# Patient Record
Sex: MALE | Race: WHITE | Employment: FULL TIME | URBAN - NONMETROPOLITAN AREA
[De-identification: names, ages, dates, MRNs, and addresses within clinical notes are randomized per-mention and may not be internally consistent; named-entity substitution may affect disease eponyms.]

---

## 2020-10-21 ENCOUNTER — APPOINTMENT (OUTPATIENT)
Dept: CT IMAGING | Age: 60
DRG: 287 | End: 2020-10-21
Payer: COMMERCIAL

## 2020-10-21 ENCOUNTER — APPOINTMENT (OUTPATIENT)
Dept: GENERAL RADIOLOGY | Age: 60
DRG: 287 | End: 2020-10-21
Payer: COMMERCIAL

## 2020-10-21 ENCOUNTER — HOSPITAL ENCOUNTER (INPATIENT)
Age: 60
LOS: 2 days | Discharge: HOME | DRG: 287 | End: 2020-10-23
Attending: EMERGENCY MEDICINE | Admitting: INTERNAL MEDICINE
Payer: COMMERCIAL

## 2020-10-21 ENCOUNTER — HOSPITAL ENCOUNTER (OUTPATIENT)
Dept: NON INVASIVE DIAGNOSTICS | Age: 60
Discharge: HOME | DRG: 287 | End: 2020-10-21
Payer: COMMERCIAL

## 2020-10-21 PROBLEM — I50.9 CHF (CONGESTIVE HEART FAILURE) (HCC): Status: ACTIVE | Noted: 2020-10-21

## 2020-10-21 PROBLEM — E03.9 HYPOTHYROID: Status: ACTIVE | Noted: 2020-10-21

## 2020-10-21 PROBLEM — I50.43 ACUTE ON CHRONIC COMBINED SYSTOLIC AND DIASTOLIC CHF, NYHA CLASS 3 (HCC): Status: ACTIVE | Noted: 2020-10-21

## 2020-10-21 LAB
ABSOLUTE EOS #: <0.03 K/UL (ref 0–0.44)
ABSOLUTE IMMATURE GRANULOCYTE: 0.03 K/UL (ref 0–0.3)
ABSOLUTE LYMPH #: 1.51 K/UL (ref 1.1–3.7)
ABSOLUTE MONO #: 0.78 K/UL (ref 0.1–1.2)
ANION GAP SERPL CALCULATED.3IONS-SCNC: 13 MMOL/L (ref 9–17)
BASOPHILS # BLD: 1 % (ref 0–2)
BASOPHILS ABSOLUTE: 0.08 K/UL (ref 0–0.2)
BNP INTERPRETATION: ABNORMAL
BUN BLDV-MCNC: 5 MG/DL (ref 8–23)
BUN/CREAT BLD: 8 (ref 9–20)
CALCIUM SERPL-MCNC: 9.5 MG/DL (ref 8.6–10.4)
CHLORIDE BLD-SCNC: 96 MMOL/L (ref 98–107)
CO2: 25 MMOL/L (ref 20–31)
CREAT SERPL-MCNC: 0.59 MG/DL (ref 0.7–1.2)
D-DIMER QUANTITATIVE: 0.5 MG/L FEU (ref 0–0.59)
DIFFERENTIAL TYPE: ABNORMAL
EKG ATRIAL RATE: 115 BPM
EKG P AXIS: 54 DEGREES
EKG P-R INTERVAL: 150 MS
EKG Q-T INTERVAL: 352 MS
EKG QRS DURATION: 112 MS
EKG QTC CALCULATION (BAZETT): 486 MS
EKG R AXIS: -10 DEGREES
EKG T AXIS: 39 DEGREES
EKG VENTRICULAR RATE: 115 BPM
EOSINOPHILS RELATIVE PERCENT: 0 % (ref 1–4)
GFR AFRICAN AMERICAN: >60 ML/MIN
GFR NON-AFRICAN AMERICAN: >60 ML/MIN
GFR SERPL CREATININE-BSD FRML MDRD: ABNORMAL ML/MIN/{1.73_M2}
GFR SERPL CREATININE-BSD FRML MDRD: ABNORMAL ML/MIN/{1.73_M2}
GLUCOSE BLD-MCNC: 145 MG/DL (ref 70–99)
HCT VFR BLD CALC: 44.5 % (ref 40.7–50.3)
HEMOGLOBIN: 15.1 G/DL (ref 13–17)
IMMATURE GRANULOCYTES: 0 %
LV EF: 30 %
LVEF MODALITY: NORMAL
LYMPHOCYTES # BLD: 20 % (ref 24–43)
MAGNESIUM: 1.9 MG/DL (ref 1.6–2.6)
MCH RBC QN AUTO: 35.4 PG (ref 25.2–33.5)
MCHC RBC AUTO-ENTMCNC: 33.9 G/DL (ref 28.4–34.8)
MCV RBC AUTO: 104.2 FL (ref 82.6–102.9)
MONOCYTES # BLD: 10 % (ref 3–12)
NRBC AUTOMATED: 0 PER 100 WBC
PDW BLD-RTO: 12.6 % (ref 11.8–14.4)
PLATELET # BLD: 142 K/UL (ref 138–453)
PLATELET ESTIMATE: ABNORMAL
PMV BLD AUTO: 11.3 FL (ref 8.1–13.5)
POTASSIUM SERPL-SCNC: 3.6 MMOL/L (ref 3.7–5.3)
PRO-BNP: 817 PG/ML
RBC # BLD: 4.27 M/UL (ref 4.21–5.77)
RBC # BLD: ABNORMAL 10*6/UL
SEG NEUTROPHILS: 69 % (ref 36–65)
SEGMENTED NEUTROPHILS ABSOLUTE COUNT: 5.14 K/UL (ref 1.5–8.1)
SODIUM BLD-SCNC: 134 MMOL/L (ref 135–144)
THYROXINE, FREE: 0.36 NG/DL (ref 0.93–1.7)
TROPONIN INTERP: NORMAL
TROPONIN INTERP: NORMAL
TROPONIN T: NORMAL NG/ML
TROPONIN T: NORMAL NG/ML
TROPONIN, HIGH SENSITIVITY: 17 NG/L (ref 0–22)
TROPONIN, HIGH SENSITIVITY: 18 NG/L (ref 0–22)
TSH SERPL DL<=0.05 MIU/L-ACNC: 49.88 MIU/L (ref 0.3–5)
WBC # BLD: 7.5 K/UL (ref 3.5–11.3)
WBC # BLD: ABNORMAL 10*3/UL

## 2020-10-21 PROCEDURE — 1200000000 HC SEMI PRIVATE

## 2020-10-21 PROCEDURE — 6360000002 HC RX W HCPCS: Performed by: NURSE PRACTITIONER

## 2020-10-21 PROCEDURE — 83036 HEMOGLOBIN GLYCOSYLATED A1C: CPT

## 2020-10-21 PROCEDURE — 93005 ELECTROCARDIOGRAM TRACING: CPT | Performed by: EMERGENCY MEDICINE

## 2020-10-21 PROCEDURE — 84443 ASSAY THYROID STIM HORMONE: CPT

## 2020-10-21 PROCEDURE — 84484 ASSAY OF TROPONIN QUANT: CPT

## 2020-10-21 PROCEDURE — 93010 ELECTROCARDIOGRAM REPORT: CPT | Performed by: INTERNAL MEDICINE

## 2020-10-21 PROCEDURE — 84439 ASSAY OF FREE THYROXINE: CPT

## 2020-10-21 PROCEDURE — 83880 ASSAY OF NATRIURETIC PEPTIDE: CPT

## 2020-10-21 PROCEDURE — 99284 EMERGENCY DEPT VISIT MOD MDM: CPT

## 2020-10-21 PROCEDURE — 6360000002 HC RX W HCPCS

## 2020-10-21 PROCEDURE — 36415 COLL VENOUS BLD VENIPUNCTURE: CPT

## 2020-10-21 PROCEDURE — 93306 TTE W/DOPPLER COMPLETE: CPT

## 2020-10-21 PROCEDURE — 80048 BASIC METABOLIC PNL TOTAL CA: CPT

## 2020-10-21 PROCEDURE — 71045 X-RAY EXAM CHEST 1 VIEW: CPT

## 2020-10-21 PROCEDURE — 6360000004 HC RX CONTRAST MEDICATION: Performed by: EMERGENCY MEDICINE

## 2020-10-21 PROCEDURE — 2580000003 HC RX 258: Performed by: NURSE PRACTITIONER

## 2020-10-21 PROCEDURE — 83735 ASSAY OF MAGNESIUM: CPT

## 2020-10-21 PROCEDURE — 6370000000 HC RX 637 (ALT 250 FOR IP): Performed by: NURSE PRACTITIONER

## 2020-10-21 PROCEDURE — 99255 IP/OBS CONSLTJ NEW/EST HI 80: CPT | Performed by: INTERNAL MEDICINE

## 2020-10-21 PROCEDURE — 85379 FIBRIN DEGRADATION QUANT: CPT

## 2020-10-21 PROCEDURE — 71260 CT THORAX DX C+: CPT

## 2020-10-21 PROCEDURE — 2500000003 HC RX 250 WO HCPCS

## 2020-10-21 PROCEDURE — 85025 COMPLETE CBC W/AUTO DIFF WBC: CPT

## 2020-10-21 RX ORDER — METOPROLOL SUCCINATE 25 MG/1
25 TABLET, EXTENDED RELEASE ORAL DAILY
Status: DISCONTINUED | OUTPATIENT
Start: 2020-10-21 | End: 2020-10-22

## 2020-10-21 RX ORDER — FUROSEMIDE 10 MG/ML
40 INJECTION INTRAMUSCULAR; INTRAVENOUS DAILY
Status: DISCONTINUED | OUTPATIENT
Start: 2020-10-21 | End: 2020-10-22

## 2020-10-21 RX ORDER — ACETAMINOPHEN 325 MG/1
650 TABLET ORAL EVERY 6 HOURS PRN
Status: DISCONTINUED | OUTPATIENT
Start: 2020-10-21 | End: 2020-10-23 | Stop reason: HOSPADM

## 2020-10-21 RX ORDER — FAMOTIDINE 20 MG/1
20 TABLET, FILM COATED ORAL 2 TIMES DAILY
Status: DISCONTINUED | OUTPATIENT
Start: 2020-10-21 | End: 2020-10-23 | Stop reason: HOSPADM

## 2020-10-21 RX ORDER — ONDANSETRON 2 MG/ML
4 INJECTION INTRAMUSCULAR; INTRAVENOUS EVERY 6 HOURS PRN
Status: DISCONTINUED | OUTPATIENT
Start: 2020-10-21 | End: 2020-10-23 | Stop reason: HOSPADM

## 2020-10-21 RX ORDER — LEVOTHYROXINE SODIUM 0.15 MG/1
150 TABLET ORAL DAILY
Status: DISCONTINUED | OUTPATIENT
Start: 2020-10-22 | End: 2020-10-23 | Stop reason: HOSPADM

## 2020-10-21 RX ORDER — POLYETHYLENE GLYCOL 3350 17 G/17G
17 POWDER, FOR SOLUTION ORAL DAILY PRN
Status: DISCONTINUED | OUTPATIENT
Start: 2020-10-21 | End: 2020-10-23 | Stop reason: HOSPADM

## 2020-10-21 RX ORDER — SODIUM CHLORIDE 0.9 % (FLUSH) 0.9 %
10 SYRINGE (ML) INJECTION PRN
Status: DISCONTINUED | OUTPATIENT
Start: 2020-10-21 | End: 2020-10-23 | Stop reason: HOSPADM

## 2020-10-21 RX ORDER — SODIUM CHLORIDE 0.9 % (FLUSH) 0.9 %
10 SYRINGE (ML) INJECTION EVERY 12 HOURS SCHEDULED
Status: DISCONTINUED | OUTPATIENT
Start: 2020-10-21 | End: 2020-10-23 | Stop reason: HOSPADM

## 2020-10-21 RX ORDER — ACETAMINOPHEN 650 MG/1
650 SUPPOSITORY RECTAL EVERY 6 HOURS PRN
Status: DISCONTINUED | OUTPATIENT
Start: 2020-10-21 | End: 2020-10-23 | Stop reason: HOSPADM

## 2020-10-21 RX ORDER — LOSARTAN POTASSIUM 25 MG/1
25 TABLET ORAL DAILY
Status: DISCONTINUED | OUTPATIENT
Start: 2020-10-21 | End: 2020-10-23 | Stop reason: HOSPADM

## 2020-10-21 RX ORDER — PROMETHAZINE HYDROCHLORIDE 25 MG/1
12.5 TABLET ORAL EVERY 6 HOURS PRN
Status: DISCONTINUED | OUTPATIENT
Start: 2020-10-21 | End: 2020-10-23 | Stop reason: HOSPADM

## 2020-10-21 RX ORDER — POTASSIUM CHLORIDE 20 MEQ/1
40 TABLET, EXTENDED RELEASE ORAL ONCE
Status: COMPLETED | OUTPATIENT
Start: 2020-10-21 | End: 2020-10-21

## 2020-10-21 RX ADMIN — METOPROLOL SUCCINATE 25 MG: 25 TABLET, EXTENDED RELEASE ORAL at 12:40

## 2020-10-21 RX ADMIN — LOSARTAN POTASSIUM 25 MG: 25 TABLET ORAL at 12:40

## 2020-10-21 RX ADMIN — FAMOTIDINE 20 MG: 20 TABLET, FILM COATED ORAL at 21:33

## 2020-10-21 RX ADMIN — SODIUM CHLORIDE, PRESERVATIVE FREE 10 ML: 5 INJECTION INTRAVENOUS at 21:50

## 2020-10-21 RX ADMIN — POTASSIUM CHLORIDE 40 MEQ: 1500 TABLET, EXTENDED RELEASE ORAL at 16:08

## 2020-10-21 RX ADMIN — IOPAMIDOL 75 ML: 755 INJECTION, SOLUTION INTRAVENOUS at 08:41

## 2020-10-21 RX ADMIN — FUROSEMIDE 40 MG: 10 INJECTION, SOLUTION INTRAMUSCULAR; INTRAVENOUS at 12:40

## 2020-10-21 RX ADMIN — ENOXAPARIN SODIUM 40 MG: 40 INJECTION, SOLUTION INTRAVENOUS; SUBCUTANEOUS at 12:40

## 2020-10-21 ASSESSMENT — PAIN SCALES - GENERAL
PAINLEVEL_OUTOF10: 0

## 2020-10-21 NOTE — ED PROVIDER NOTES
[facility] COMPLAINT   Chief Complaint   Patient presents with    Tachycardia     Pt states \"I woke up several times with my heart racing during the night\"    Shortness of Breath     Progressive over 1-2months with exertion      HPI   Live Courtney is a 56 y.o. male who presents with shortness of breath. The onset was months but it is worse lately and associated with tachycardia and waking up in the night short of breath. He notes that he also has new onset lower leg swelling. He also has no chest pain but shortness of breath on exertion. He has not been to his regular doctor in a long time      REVIEW OF SYSTEMS   Cardiac: +palpitations, Denies syncope  Respiratory: +SOB as above  Neurologic: Denies syncope or LOC  GI: Denies Vomiting, Denies Diarrhea  General: Denies measured Fever  All other review of systems otherwise negative. PAST MEDICAL & SURGICAL HISTORY   Past Medical History:   Diagnosis Date    Hypertension      History reviewed. No pertinent surgical history.    CURRENT MEDICATIONS      ALLERGIES   No Known Allergies   SOCIAL & FAMILY HISTORY   Social History     Socioeconomic History    Marital status:      Spouse name: None    Number of children: None    Years of education: None    Highest education level: None   Occupational History    None   Social Needs    Financial resource strain: None    Food insecurity     Worry: None     Inability: None    Transportation needs     Medical: None     Non-medical: None   Tobacco Use    Smoking status: Former Smoker     Last attempt to quit: 2011     Years since quittin.8    Smokeless tobacco: Never Used   Substance and Sexual Activity    Alcohol use: None    Drug use: None    Sexual activity: None   Lifestyle    Physical activity     Days per week: None     Minutes per session: None    Stress: None   Relationships    Social connections     Talks on phone: None     Gets together: None     Attends Worship service: None     Active member of club or organization: None     Attends meetings of clubs or organizations: None     Relationship status: None    Intimate partner violence     Fear of current or ex partner: None     Emotionally abused: None     Physically abused: None     Forced sexual activity: None   Other Topics Concern    None   Social History Narrative    None     History reviewed. No pertinent family history. PHYSICAL EXAM   VITAL SIGNS: Pulse 107   Temp 98.1 °F (36.7 °C)   Resp 13   Ht 5' 11\" (1.803 m)   Wt 260 lb (117.9 kg)   SpO2 95%   BMI 36.26 kg/m²    Constitutional: Well developed, well nourished, no acute distress   HENT: Atraumatic, wet mucus membranes  Neck: supple, no JVD   Respiratory: Lungs reveal some mild tachypnea   Cardiovascular: regular rate, no murmurs  GI: Soft, nontender, normal bowel sounds  Musculoskeletal: mild-mod bilateral LE edema, no acute deformities  Integument: Skin is warm and dry, no petechiae   Neurologic: Alert & oriented, normal speech  Psych: Pleasant affect, the patient does not appear anxious   EKG (Interpreted by me)  Sinus tach rhythm at 115 BPM, no stemi      RADIOLOGY/PROCEDURES   CT CHEST PULMONARY EMBOLISM W CONTRAST   Final Result   No evidence of pulmonary embolism or acute pulmonary abnormality. Trace pericardial effusion. Hepatic steatosis. XR CHEST PORTABLE   Final Result   No acute findings. ED COURSE & MEDICAL DECISION MAKING   Pertinent Labs & Imaging studies reviewed and interpreted. (See chart for details)     Vitals:    10/21/20 0700   Pulse: 107   Resp: 13   Temp:    SpO2: 95%     Consultation: Dr. Clau Frederick was consulted.  He had pt undergo ultrasound and dr Lela Moody was paged for for admission (paged at 10:30 AM)  Differential Diagnosis: COPD exacerbation, foreign body aspiration, Congestive Heart Failure, Myocardial Infarction, Pulmonary Embolus, Pneumonia, Pneumothorax, other     MDM:

## 2020-10-21 NOTE — PLAN OF CARE
Nutrition Problem #1: Altered nutrition-related lab values  Intervention: Food and/or Nutrient Delivery: Continue Current Diet  Nutritional Goals: >75% of meals    Electronically signed by Severo GONZALEZ RDN, OMAR on 10/21/2020 at 2:52 PM    Contact Number:  [phone]

## 2020-10-21 NOTE — PROGRESS NOTES
Discussed discharge plans with the patient. Patient is a 56year old male here with CHF. He is alert and oriented. Patient is  and lives at home with his wife. He uses no medical equipment. The patient does the cooking. His wife and him share in the cleaning. Patient is independent with his ADL's. He manages his own medication and drives. His PCP is Dr. Makenna Jones MD. He has medical insurance that helps with medication costs. The discharge plan is home. The DX of CHF is new for the patient. Talked about the importance of a low sodium diet and weighing himself daily. Spoke with his nurse and she will be getting him information on his CHF DX. He does not have advance directives and is not interested at this time. JANEEW to monitor and assist with any needs or concerns as they arise.     DAVIS Cole

## 2020-10-21 NOTE — CONSULTS
Jovan Dean am scribing for and in the presence of Clement Byrnes MD, F.A.C.C..    Patient: Mary Hedrick  : 1960  Date of Admission: 10/21/2020  Primary Care Physician: Oumou Beebe  Today's Date: 10/21/2020    REASON FOR CONSULTATION: Tachycardia (Pt states \"I woke up several times with my heart racing during the night\") and Shortness of Breath (Progressive over 1-2months with exertion)      HPI: Mr. Nydia Mcdowell is a 56 y.o. male with no prior cardiac history who was admitted to the hospital because of worsening shortness of breath, currently treated for acute on chronic combined CHF, NYHA class III. Patient has history of hypertension and dyslipidemia. He did not see any doctor for the past 9 years. Not on any medications at home. History of smoking. He smoked 2 packs a day and quit in . Denies any history of diabetes. He reported no significant family history of premature CAD. He woke up this morning short of breath and was heart racing. He is complaining of progressive shortness of breath on exertion since the summer. He has bilateral lower extremity swelling that is getting worse over the past couple weeks. He is short of breath on mild exertion and this is the first time he is short of breath at rest.  Work-up in the emergency room showed elevated BNP, evidence of pulmonary vascular congestion and sinus tachycardia and ECG with nonspecific ST segment changes. I reviewed his echo on 10/21/2020, ejection fraction 35%. Severe global hypokinesis with no segmental abnormalities. Moderate diastolic dysfunction. No significant valvular abnormalities. He denies any chest pain or pressure. He complains of chest tightness which he attributes it to his breathing problem rather than an actual chest pain. No significant radiation. No sweating or diaphoresis. His wife thinks that he has sleep apnea syndrome because of loud snoring, waking up choking and daytime fatigue.     Past Medical History:   Diagnosis Date    Hypertension        CURRENT ALLERGIES: Patient has no known allergies. REVIEW OF SYSTEMS: 14 systems were reviewed. Pertinent positives and negatives as above, all else negative. History reviewed. No pertinent surgical history. Social History:  Social History     Tobacco Use    Smoking status: Former Smoker     Last attempt to quit: 2011     Years since quittin.8    Smokeless tobacco: Never Used   Substance Use Topics    Alcohol use: Not on file    Drug use: Not on file        CURRENT MEDICATIONS:  No outpatient medications have been marked as taking for the 10/21/20 encounter [facility] Encounter). sodium chloride flush 0.9 % injection 10 mL, 2 times per day  sodium chloride flush 0.9 % injection 10 mL, PRN  acetaminophen (TYLENOL) tablet 650 mg, Q6H PRN    Or  acetaminophen (TYLENOL) suppository 650 mg, Q6H PRN  polyethylene glycol (GLYCOLAX) packet 17 g, Daily PRN  promethazine (PHENERGAN) tablet 12.5 mg, Q6H PRN    Or  ondansetron (ZOFRAN) injection 4 mg, Q6H PRN  famotidine (PEPCID) tablet 20 mg, BID  enoxaparin (LOVENOX) injection 40 mg, Daily  losartan (COZAAR) tablet 25 mg, Daily  metoprolol succinate (TOPROL XL) extended release tablet 25 mg, Daily  furosemide (LASIX) injection 40 mg, Daily         FAMILY HISTORY: Reviewed, no significant family history of premature CAD. PHYSICAL EXAM:   Pulse 107   Temp 98.1 °F (36.7 °C)   Resp 13   Ht 5' 11\" (1.803 m)   Wt 260 lb (117.9 kg)   SpO2 95%   BMI 36.26 kg/m²  Body mass index is 36.26 kg/m². Constitutional: He is oriented to person, place, and time. He appears well-developed and well-nourished. In no acute distress. HEENT: Normocephalic and atraumatic. No JVD present. Carotid bruit is not present. No mass and no thyromegaly present. No lymphadenopathy present. Cardiovascular: Tachycardic rate, regular rhythm, normal heart sounds. Exam reveals no gallop and no friction rubs.  No heart murmur heard.   Pulmonary/Chest: Effort normal and breath sounds normal. No respiratory distress. He has no wheezes, rhonchi or rales. Abdominal: Soft, non-tender. Bowel sounds and aorta are normal. He exhibits no organomegaly, mass or bruit. Extremities: 1+ lower extremity pitting pedal edema. 1/2 way up to the knees bilaterally No cyanosis and no clubbing. Pulses are 2+ radial and carotid pulses. 2+ dorsalis pedis and posterior tibial pulses bilaterally. Neurological: He is alert and oriented to person, place, and time. No evidence of gross cranial nerve deficit. Coordination appeared normal.   Skin: Skin is warm and dry. There is no rash or diaphoresis. Psychiatric: He has a normal mood and affect. His speech is normal and behavior is normal.      MOST RECENT LABS ON RECORD:   Lab Results   Component Value Date    WBC 7.5 10/21/2020    HGB 15.1 10/21/2020    HCT 44.5 10/21/2020     10/21/2020     (L) 10/21/2020    K 3.6 (L) 10/21/2020    CL 96 (L) 10/21/2020    CREATININE 0.59 (L) 10/21/2020    BUN 5 (L) 10/21/2020    CO2 25 10/21/2020        ASSESSMENT:  Patient Active Problem List    Diagnosis Date Noted    CHF (congestive heart failure) ([facility] [address] 10/21/2020     Priority: Low    Acute on chronic combined systolic and diastolic CHF, NYHA class 3 ([address] 10/21/2020     Priority: Low       PLAN:    Patient presented to the hospital with worsening shortness of breath, found to have acute on chronic combined CHF with ejection fraction of 35%. Suspect hypertensive heart failure although his blood pressure is very well controlled today probably because of low ejection fraction. Patient has history of hypertension, not taking any medicine for the past 9 years. History of dyslipidemia. He also has a long history of smoking. Complaining of chest tightness along with sinus tachycardia nonspecific ST segment abnormalities on the EKG. I had a very long discussion with the patient and his wife.   I told him he must stay in the hospital for the management of his CHF. Also because two thirds of the cases with low ejection fraction along with his history of chest tightness and multiple risk factors for CAD can be caused by coronary artery disease, I think it is a must to get a cardiac catheterization tomorrow morning to differentiate between ischemic and nonischemic cardiomyopathy. We will start him on Toprol-XL 25 mg daily. We will start him on low-dose losartan for now. Start Lasix 40 mg daily. Daily weight, low-salt diet and strict input/output. Closely monitor kidney function and electrolytes. Heart catheterization with coronary angiography:  I discussed the risks, benefits, and alternatives to the procedure including the risk of bleeding, contrast induced allergy and/or kidney damage, arrythmia, stroke, heart attack, death, or the need for further procedures. I also discussed the fact that although treatment with simple medical management is a potential treatment option in place of cardiac catheterization, I expressed my opinion that cardiac catheterization in order to define his coronary anatomy and rule out severe 3 vessel or left main coronary artery disease would significant help guide the most appropriate treatment strategy ranging from no treatment to medications, to stents, to even bypass surgery. Mr. Yinka Birch verbalized understanding of the risks benefits and alternatives and stated that he would like to proceed with heart catheterization. I also discussed the advantages and disadvantages of having his procedure performed here at [facility] vs. a larger hospital such as [facility]. Beyond the obvious advantage of convenience, I also explained potential disadvantages including the inability to have immediate cardiac stenting performed or the presence of on site CT surgical backup.  However, because of the lack of significant high risk feature on his stress test, I did tell him I thought that in his particular case, it would likely be safe to perform the procedure here. Therefore, after considering the options, Mr. Deidre Little said he would prefer to have the procedure performed here at [facility] and so I scheduled the coronary angiography for tomorrow morning. .    I told his wife that her concern regarding him having obstructive sleep apnea syndrome is reasonable and we will test him for sleep apnea as an outpatient after improvement of his volume status. Morbid obesity: Body mass index is 36.61 kg/m². I also briefly discussed both diet and exercise strategies for him to continue to loses weight and he was very receptive to this. Once again, thank you for allowing me to participate in this patients care. Please do not hesitate to contact me if I could be of any further assistance. Sincerely,  Cory King MD, MS, F.A.C.C. [facility]) [facility], [address] [place] [address], [address]  Phone: [phone], Fax: [phone]      I believe that the risk of significant morbidity and mortality related to the patient's current medical conditions are: intermediate-high. The documentation recorded by the scribe, accurately and completely reflects the services I personally performed and the decisions made by me. Cory King MD, F.A.C.C.  October 21, 2020

## 2020-10-21 NOTE — PROGRESS NOTES
Nutrition Assessment     Type and Reason for Visit: Initial(Nutrition screen)    Nutrition Recommendations/Plan:   1. Consider protein with each meal   2. Increase fruit and vegetable intake. 3. Reviewed low sodium diet, and may also benefit from Heart Healthy diet plan. Nutrition Assessment:  Altered nutrition-related lab values related to cardiac dysfunction, endocrine dysfuntion as evidenced by (Na: 134, K: 3.6, BUN: 5, Creatinine: 0.59, Glucose: 145, and lower intakes for one day. Pt reported not feeling well for three days, but the last day was very rough. Pt reports feeling very tired, and not able to sleep at night. He typically drinks 4 shots of whiskey to help him sleep daily. Pt reported not so good sleep and drinking coffee to boost the energy. Pt reports also typically not eating breakfast in the morning, but he eats lunch and dinner. No nausea per pt, however he felt queasy and felt like able to vomit, but did not. Pt reports steady weight for the last two years~ 260lbs. Note some edema in lower extremities, +2 pitting. Labs reviewed, consistent with a poor appetite for a day, BUN and creatinine are low. Potassium low secondary to loose stool. Pt meets moderate nutrition risk and does not meet the criteria for malnutrition. Malnutrition Assessment:  Malnutrition Status: At risk for malnutrition (Comment)       Recent Labs     10/21/20  0650   *   K 3.6*   CL 96*   CO2 25   BUN 5*   CREATININE 0.59*   GLUCOSE 145*   GFR               No results found for: LABALBU     Nutrition Related Findings: edema: RLE: +2 pitting, LLE: +2 pitting      Current Nutrition Therapies:    DIET LOW SODIUM 2 GM;     Anthropometric Measures:  · Height: 5' 11\" (180.3 cm)  · Current Body Wt: 262 lb (118.8 kg)   · BMI: 36.6    Nutrition Diagnosis:   · Altered nutrition-related lab values related to cardiac dysfunction, endocrine dysfuntion as evidenced by (Na: 134, K: 3.6, BUN: 5, Creatinine: 0.59, Glucose: 145)      Nutrition Interventions:   Food and/or Nutrient Delivery:  Continue Current Diet  Nutrition Education/Counseling:  Education initiated   Coordination of Nutrition Care:  Continued Inpatient Monitoring    Goals:  >75% of meals       Nutrition Monitoring and Evaluation:   Behavioral-Environmental Outcomes:  Beliefs and Attitutes   Food/Nutrient Intake Outcomes:  Food and Nutrient Intake  Physical Signs/Symptoms Outcomes:  Biochemical Data, Fluid Status or Edema, Weight     Discharge Planning:    Continue current diet     Electronically signed by Tahir Munson RD, LD on 10/21/20 at 2:28 PM EDT    Contact: [phone]

## 2020-10-21 NOTE — PLAN OF CARE
Problem: Pain:  Goal: Pain level will decrease  Description: Pain level will decrease  Outcome: Ongoing  Note: Pain assessed every 4 hours. Patient is able to communicate with staff the need for pain interventions. Patient currently not complaining of any pain. Pain medications available as needed. Will continue to monitor. Problem: Pain:  Goal: Control of acute pain  Description: Control of acute pain  Outcome: Ongoing     Problem: Pain:  Goal: Control of chronic pain  Description: Control of chronic pain  Outcome: Ongoing     Problem: Falls - Risk of:  Goal: Will remain free from falls  Description: Will remain free from falls  Outcome: Ongoing  Note: Fall assessment completed daily. Bed in lowest position. Call light within reach. Side rails up 2/4. Patient ambulates independently in the room. Will continue to monitor. Problem: Falls - Risk of:  Goal: Absence of physical injury  Description: Absence of physical injury  Outcome: Ongoing  Note: Patient is free from physical injury. Problem: OXYGENATION/RESPIRATORY FUNCTION  Goal: Patient will maintain patent airway  Outcome: Ongoing  Note: Patient is awake and alert, able to cough and clear secretions if present. Will continue to monitor. Problem: OXYGENATION/RESPIRATORY FUNCTION  Goal: Patient will achieve/maintain normal respiratory rate/effort  Description: Respiratory rate and effort will be within normal limits for the patient  Outcome: Ongoing  Note: See respiratory assessment. Problem: HEMODYNAMIC STATUS  Goal: Patient has stable vital signs and fluid balance  Outcome: Ongoing  Note: Will monitor vital signs and intake and output. Problem: FLUID AND ELECTROLYTE IMBALANCE  Goal: Fluid and electrolyte balance are achieved/maintained  Outcome: Ongoing  Note: Will monitor intake and output. Will monitor labs.       Problem: ACTIVITY INTOLERANCE/IMPAIRED MOBILITY  Goal: Mobility/activity is maintained at optimum level for patient  Outcome: Ongoing  Note: Patient ambulates independently in the room and tolerates this activity well.

## 2020-10-21 NOTE — H&P
History and Physical    Patient:  Mary Hedrick  MRN: [id number]    Chief Complaint:  Heart racing    History Obtained From:  patient, electronic medical record    PCP: Oumou Beebe MD    History of Present Illness: The patient is a 56 y.o. male who presented to the ER with complaints of his heart racing when he woke up. He complained of progressing SOB over the last 1-2 months. He explained that he is waking up at night often and is SOB. He does admit to leg edema. He denied CP but does admit to some chest pressure. He has not been to a PCP in some time. Upon his evaluation he was found to have Acute on Chronic CHF and was admitted for diuresis. His Echocardiogram show EF of 30% and will be planned for a Cardiac Cath in the am.     Past Medical History:        Diagnosis Date    Hypertension        Past Surgical History:    History reviewed. No pertinent surgical history. Family History:   History reviewed. No pertinent family history. Social History:   TOBACCO:   reports that he quit smoking about 9 years ago. He has never used smokeless tobacco.  ETOH:   reports current alcohol use. ELICIT DRUG USE:    Social History     Substance and Sexual Activity   Drug Use Never     OCCUPATION: Retired      Allergies:  Patient has no known allergies. Medications Prior to Admission:    Prior to Admission medications    Not on File       Review of Systems:  Constitutional:negative  for fevers, and negative for chills.   Eyes: negative for visual disturbance   ENT: negative for sore throat, negative nasal congestion, and negative for earache  Respiratory: positive for shortness of breath, negative for cough, and negative for wheezing  Cardiovascular: negative for chest pain, negative for palpitations, and negative for syncope, positive chest pressure  Gastrointestinal: negative for abdominal pain, negative for nausea,negative for vomiting, negative for diarrhea, negative for constipation, and negative for hematochezia or melena  Genitourinary: negative for dysuria, negative for urinary urgency, negative for urinary frequency, and negative for hematuria  Skin: negative for skin rash, and negative for skin lesions  Neurological: negative for unilateral weakness, numbness or tingling. Physical Exam:    Vitals:   Temp: 97.7 °F (36.5 °C)  BP: (!) 152/98  Resp: 18  Pulse: 100  SpO2: 98 %  24HR INTAKE/OUTPUT:      Intake/Output Summary (Last 24 hours) at 10/21/2020 1558  Last data filed at 10/21/2020 1339  Gross per 24 hour   Intake --   Output 900 ml   Net -900 ml       Exam:  GEN:  alert and oriented to person, place and time, well-developed and well-nourished, in no acute distress  EYES: No gross abnormalities. , PERRL and EOMI  NECK: normal, supple, no lymphadenopathy,  no carotid bruits  PULM: clear to auscultation bilaterally- no wheezes, rales or rhonchi, normal air movement, no respiratory distress  COR: regular rate & rhythm, no gallops, S1 normal and S2 normal  ABD:  soft, non-tender, non-distended, normal bowel sounds, no masses or organomegaly  EXT:   edema: 1+ affecting bilateral foot, bilateral ankle, bilateral calf  NEURO: follows commands, LOMBARDI, no deficits  SKIN:  no rashes or significant lesions  -----------------------------------------------------------------  Diagnostic Data:   Lab Results   Component Value Date    WBC 7.5 10/21/2020    HGB 15.1 10/21/2020     10/21/2020       Lab Results   Component Value Date    BUN 5 (L) 10/21/2020    CREATININE 0.59 (L) 10/21/2020     (L) 10/21/2020    K 3.6 (L) 10/21/2020    CALCIUM 9.5 10/21/2020    CL 96 (L) 10/21/2020    CO2 25 10/21/2020    LABGLOM >60 10/21/2020       No results found for: Reggie Antu, EPITHUA, LEUKOCYTESUR, SPECGRAV, GLUCOSEU, KETUA, PROTEINU, HGBUR, CASTUA, CRYSTUA, BACTERIA, YEAST    Lab Results   Component Value Date    TROPONINT NOT REPORTED 10/21/2020    PROBNP 817 (H) 10/21/2020       Xr Chest Portable    Result Date: 10/21/2020  EXAMINATION: ONE XRAY VIEW OF THE CHEST 10/21/2020 7:27 am COMPARISON: None. HISTORY: ORDERING SYSTEM PROVIDED HISTORY: dyspnea TECHNOLOGIST PROVIDED HISTORY: dyspnea FINDINGS: Lungs are clear. Cardiomegaly. No pulmonary edema. No acute findings. Ct Chest Pulmonary Embolism W Contrast    Result Date: 10/21/2020  EXAMINATION: CTA OF THE CHEST 10/21/2020 8:40 am TECHNIQUE: CTA of the chest was performed after the administration of intravenous contrast.  Multiplanar reformatted images are provided for review. MIP images are provided for review. Dose modulation, iterative reconstruction, and/or weight based adjustment of the mA/kV was utilized to reduce the radiation dose to as low as reasonably achievable. COMPARISON: None. HISTORY: ORDERING SYSTEM PROVIDED HISTORY: tachy, sob, weak + d-dimer TECHNOLOGIST PROVIDED HISTORY: tachy, sob, weak + d-dimer FINDINGS: Pulmonary Arteries: Pulmonary arteries are adequately opacified for evaluation. No evidence of intraluminal filling defect to suggest pulmonary embolism. Main pulmonary artery is normal in caliber. Mediastinum: No evidence of mediastinal lymphadenopathy. There is a trace pericardial effusion. There is no acute abnormality of the thoracic aorta. Lungs/pleura: The lungs are without acute process. No focal consolidation or pulmonary edema. No evidence of pleural effusion or pneumothorax. Upper Abdomen: There is hepatic steatosis. Soft Tissues/Bones: No acute bone or soft tissue abnormality. No evidence of pulmonary embolism or acute pulmonary abnormality. Trace pericardial effusion. Hepatic steatosis. Assessment:    Active Problems:    CHF (congestive heart failure) (HCC)    Acute on chronic combined systolic and diastolic CHF, NYHA class 3 (HCC)  Resolved Problems:    * No resolved hospital problems.  *      Patient Active Problem List    Diagnosis Date Noted    CHF (congestive heart failure) ([address] 10/21/2020    Acute on chronic combined systolic and diastolic CHF, NYHA class 3 ([facility] [address] 10/21/2020       Plan:     · This patient requires inpatient admission because of Acute on Chronic Combined CHF  · Factors affecting the medical complexity of this patient include HTN, Hypothyroidism  · Estimated length of stay is 3 days  · Acute on Chronic Combined CHF  · Appreciate Cardiology  · Plan Cath in AM  · Telemetry  · Lasix 40 mg IV daily  · I/O  · ECHO-30% EF  · Hypothyroidism  · TSH--49.88 -- Start Synthroid 150 mcg daily  · HTN  · Cozaar 25 mg daily  · High risk medication monitoring: None    CORE MEASURES  DVT prophylaxis: Lovenox  Decubitus ulcer present on admission: No  CODE STATUS: FULL CODE  Nutrition Status: good   Physical therapy: NA   Old Charts reviewed: Yes  EKG Reviewed:  Yes  Advance Directive Addressed: Yes    CORAL Ross, NP-C  10/21/2020, 3:58 PM

## 2020-10-21 NOTE — ED NOTES
Contacted Dr. Farhad Clinton per Dr. Allie Villarreal request.     Christi Ibarra [facility]  10/21/20 1707

## 2020-10-21 NOTE — PROGRESS NOTES
Admission vitals and assessment completed at this time. Blood pressure slightly high and patient is tachycardic, vitals otherwise WNL. Patient denies pain. Patient is alert and oriented x4, patient denies numbness or tingling. Patient has impaired vision, wears glasses. Respirations even and unlabored at rest, slightly labored with exertion. Lung sounds clear-diminished throughout. Heart sounds strong, regular, tachycardic. Abdomen soft and rounded, non-tender, with active bowel sounds throughout. Patient has +2 pitting edema to BLE. Capillary refill less than 3 seconds, pulses palpable, skin warm to BLE. Scattered abrasions and bruising present. Patient denies feeling weak, troubles urinating, or any other issues at this time. Patient sitting quietly in chair with call light in reach, denies needs. Will continue to monitor.

## 2020-10-21 NOTE — PROGRESS NOTES
Patient arrived on floor at this time via wheelchair, transferred independently from wheelchair to chair in room. Will complete admission assessment, vitals, and navigator shortly.

## 2020-10-22 ENCOUNTER — APPOINTMENT (OUTPATIENT)
Dept: CARDIAC CATH/INVASIVE PROCEDURES | Age: 60
DRG: 287 | End: 2020-10-22
Payer: COMMERCIAL

## 2020-10-22 ENCOUNTER — APPOINTMENT (OUTPATIENT)
Dept: NON INVASIVE DIAGNOSTICS | Age: 60
DRG: 287 | End: 2020-10-22
Payer: COMMERCIAL

## 2020-10-22 PROBLEM — I42.9 IDIOPATHIC CARDIOMYOPATHY (HCC): Status: ACTIVE | Noted: 2020-10-22

## 2020-10-22 LAB
ABSOLUTE EOS #: <0.03 K/UL (ref 0–0.44)
ABSOLUTE IMMATURE GRANULOCYTE: <0.03 K/UL (ref 0–0.3)
ABSOLUTE LYMPH #: 1.77 K/UL (ref 1.1–3.7)
ABSOLUTE MONO #: 0.81 K/UL (ref 0.1–1.2)
ANION GAP SERPL CALCULATED.3IONS-SCNC: 14 MMOL/L (ref 9–17)
BASOPHILS # BLD: 1 % (ref 0–2)
BASOPHILS ABSOLUTE: 0.1 K/UL (ref 0–0.2)
BUN BLDV-MCNC: 10 MG/DL (ref 8–23)
BUN/CREAT BLD: 16 (ref 9–20)
CALCIUM SERPL-MCNC: 8.8 MG/DL (ref 8.6–10.4)
CHLORIDE BLD-SCNC: 93 MMOL/L (ref 98–107)
CHOLESTEROL/HDL RATIO: 3.5
CHOLESTEROL: 188 MG/DL
CO2: 26 MMOL/L (ref 20–31)
CREAT SERPL-MCNC: 0.62 MG/DL (ref 0.7–1.2)
DIFFERENTIAL TYPE: ABNORMAL
EOSINOPHILS RELATIVE PERCENT: 0 % (ref 1–4)
ESTIMATED AVERAGE GLUCOSE: 111 MG/DL
ESTIMATED AVERAGE GLUCOSE: 114 MG/DL
GFR AFRICAN AMERICAN: >60 ML/MIN
GFR NON-AFRICAN AMERICAN: >60 ML/MIN
GFR SERPL CREATININE-BSD FRML MDRD: ABNORMAL ML/MIN/{1.73_M2}
GFR SERPL CREATININE-BSD FRML MDRD: ABNORMAL ML/MIN/{1.73_M2}
GLUCOSE BLD-MCNC: 118 MG/DL (ref 70–99)
HBA1C MFR BLD: 5.5 % (ref 4–6)
HBA1C MFR BLD: 5.6 % (ref 4–6)
HCT VFR BLD CALC: 43.6 % (ref 40.7–50.3)
HDLC SERPL-MCNC: 54 MG/DL
HEMOGLOBIN: 14.6 G/DL (ref 13–17)
IMMATURE GRANULOCYTES: 0 %
LDL CHOLESTEROL: 109 MG/DL (ref 0–130)
LYMPHOCYTES # BLD: 22 % (ref 24–43)
MAGNESIUM: 1.9 MG/DL (ref 1.6–2.6)
MCH RBC QN AUTO: 35.5 PG (ref 25.2–33.5)
MCHC RBC AUTO-ENTMCNC: 33.5 G/DL (ref 28.4–34.8)
MCV RBC AUTO: 106.1 FL (ref 82.6–102.9)
MONOCYTES # BLD: 10 % (ref 3–12)
NRBC AUTOMATED: 0 PER 100 WBC
PDW BLD-RTO: 12.6 % (ref 11.8–14.4)
PLATELET # BLD: 111 K/UL (ref 138–453)
PLATELET ESTIMATE: ABNORMAL
PMV BLD AUTO: 12.6 FL (ref 8.1–13.5)
POTASSIUM SERPL-SCNC: 4 MMOL/L (ref 3.7–5.3)
RBC # BLD: 4.11 M/UL (ref 4.21–5.77)
RBC # BLD: ABNORMAL 10*6/UL
SEG NEUTROPHILS: 67 % (ref 36–65)
SEGMENTED NEUTROPHILS ABSOLUTE COUNT: 5.47 K/UL (ref 1.5–8.1)
SODIUM BLD-SCNC: 133 MMOL/L (ref 135–144)
TRIGL SERPL-MCNC: 123 MG/DL
VLDLC SERPL CALC-MCNC: NORMAL MG/DL (ref 1–30)
WBC # BLD: 8.2 K/UL (ref 3.5–11.3)
WBC # BLD: ABNORMAL 10*3/UL

## 2020-10-22 PROCEDURE — 99024 POSTOP FOLLOW-UP VISIT: CPT | Performed by: INTERNAL MEDICINE

## 2020-10-22 PROCEDURE — C1894 INTRO/SHEATH, NON-LASER: HCPCS

## 2020-10-22 PROCEDURE — C1769 GUIDE WIRE: HCPCS

## 2020-10-22 PROCEDURE — 93458 L HRT ARTERY/VENTRICLE ANGIO: CPT | Performed by: FAMILY MEDICINE

## 2020-10-22 PROCEDURE — 36415 COLL VENOUS BLD VENIPUNCTURE: CPT

## 2020-10-22 PROCEDURE — 2709999900 HC NON-CHARGEABLE SUPPLY

## 2020-10-22 PROCEDURE — 1200000000 HC SEMI PRIVATE

## 2020-10-22 PROCEDURE — C1887 CATHETER, GUIDING: HCPCS

## 2020-10-22 PROCEDURE — 6370000000 HC RX 637 (ALT 250 FOR IP): Performed by: FAMILY MEDICINE

## 2020-10-22 PROCEDURE — 2580000003 HC RX 258: Performed by: FAMILY MEDICINE

## 2020-10-22 PROCEDURE — 83735 ASSAY OF MAGNESIUM: CPT

## 2020-10-22 PROCEDURE — B2111ZZ FLUOROSCOPY OF MULTIPLE CORONARY ARTERIES USING LOW OSMOLAR CONTRAST: ICD-10-PCS | Performed by: FAMILY MEDICINE

## 2020-10-22 PROCEDURE — 83036 HEMOGLOBIN GLYCOSYLATED A1C: CPT

## 2020-10-22 PROCEDURE — 4A023N7 MEASUREMENT OF CARDIAC SAMPLING AND PRESSURE, LEFT HEART, PERCUTANEOUS APPROACH: ICD-10-PCS | Performed by: FAMILY MEDICINE

## 2020-10-22 PROCEDURE — 80061 LIPID PANEL: CPT

## 2020-10-22 PROCEDURE — 6360000002 HC RX W HCPCS: Performed by: FAMILY MEDICINE

## 2020-10-22 PROCEDURE — B2151ZZ FLUOROSCOPY OF LEFT HEART USING LOW OSMOLAR CONTRAST: ICD-10-PCS | Performed by: FAMILY MEDICINE

## 2020-10-22 PROCEDURE — 80048 BASIC METABOLIC PNL TOTAL CA: CPT

## 2020-10-22 PROCEDURE — 6360000004 HC RX CONTRAST MEDICATION: Performed by: INTERNAL MEDICINE

## 2020-10-22 PROCEDURE — 85025 COMPLETE CBC W/AUTO DIFF WBC: CPT

## 2020-10-22 RX ORDER — ACETAMINOPHEN 325 MG/1
650 TABLET ORAL EVERY 4 HOURS PRN
Status: DISCONTINUED | OUTPATIENT
Start: 2020-10-22 | End: 2020-10-23 | Stop reason: HOSPADM

## 2020-10-22 RX ORDER — SODIUM CHLORIDE 9 MG/ML
INJECTION, SOLUTION INTRAVENOUS CONTINUOUS
Status: DISCONTINUED | OUTPATIENT
Start: 2020-10-22 | End: 2020-10-23 | Stop reason: HOSPADM

## 2020-10-22 RX ORDER — SODIUM CHLORIDE 0.9 % (FLUSH) 0.9 %
10 SYRINGE (ML) INJECTION EVERY 12 HOURS SCHEDULED
Status: DISCONTINUED | OUTPATIENT
Start: 2020-10-22 | End: 2020-10-23 | Stop reason: HOSPADM

## 2020-10-22 RX ORDER — NITROGLYCERIN 0.4 MG/1
0.4 TABLET SUBLINGUAL EVERY 5 MIN PRN
Status: DISCONTINUED | OUTPATIENT
Start: 2020-10-22 | End: 2020-10-23 | Stop reason: HOSPADM

## 2020-10-22 RX ORDER — FUROSEMIDE 10 MG/ML
40 INJECTION INTRAMUSCULAR; INTRAVENOUS 2 TIMES DAILY
Status: DISCONTINUED | OUTPATIENT
Start: 2020-10-22 | End: 2020-10-23 | Stop reason: HOSPADM

## 2020-10-22 RX ORDER — CARVEDILOL 12.5 MG/1
12.5 TABLET ORAL 2 TIMES DAILY WITH MEALS
Status: DISCONTINUED | OUTPATIENT
Start: 2020-10-22 | End: 2020-10-23 | Stop reason: HOSPADM

## 2020-10-22 RX ORDER — SODIUM CHLORIDE 0.9 % (FLUSH) 0.9 %
10 SYRINGE (ML) INJECTION PRN
Status: DISCONTINUED | OUTPATIENT
Start: 2020-10-22 | End: 2020-10-23 | Stop reason: HOSPADM

## 2020-10-22 RX ORDER — DIPHENHYDRAMINE HCL 25 MG
50 CAPSULE ORAL ONCE
Status: DISCONTINUED | OUTPATIENT
Start: 2020-10-22 | End: 2020-10-23 | Stop reason: HOSPADM

## 2020-10-22 RX ADMIN — ENOXAPARIN SODIUM 40 MG: 40 INJECTION, SOLUTION INTRAVENOUS; SUBCUTANEOUS at 12:46

## 2020-10-22 RX ADMIN — IOPAMIDOL 80 ML: 755 INJECTION, SOLUTION INTRAVENOUS at 10:38

## 2020-10-22 RX ADMIN — FAMOTIDINE 20 MG: 20 TABLET, FILM COATED ORAL at 12:45

## 2020-10-22 RX ADMIN — FUROSEMIDE 40 MG: 10 INJECTION, SOLUTION INTRAMUSCULAR; INTRAVENOUS at 17:05

## 2020-10-22 RX ADMIN — FAMOTIDINE 20 MG: 20 TABLET, FILM COATED ORAL at 20:31

## 2020-10-22 RX ADMIN — LOSARTAN POTASSIUM 25 MG: 25 TABLET ORAL at 12:45

## 2020-10-22 RX ADMIN — Medication 10 ML: at 20:31

## 2020-10-22 RX ADMIN — SODIUM CHLORIDE, PRESERVATIVE FREE 10 ML: 5 INJECTION INTRAVENOUS at 12:56

## 2020-10-22 RX ADMIN — SODIUM CHLORIDE: 9 INJECTION, SOLUTION INTRAVENOUS at 09:37

## 2020-10-22 RX ADMIN — CARVEDILOL 12.5 MG: 12.5 TABLET, FILM COATED ORAL at 17:05

## 2020-10-22 RX ADMIN — LEVOTHYROXINE SODIUM 150 MCG: 150 TABLET ORAL at 12:45

## 2020-10-22 ASSESSMENT — PAIN SCALES - GENERAL
PAINLEVEL_OUTOF10: 0

## 2020-10-22 NOTE — PROGRESS NOTES
Baldo Daughters am scribing for and in the presence of Xiao Troncoso MD, F.A.C.C..    Patient: Rigoberto Dick  : 1960  Date of Admission: 10/21/2020  Primary Care Physician: Zina Gallo  Today's Date: 10/22/2020    REASON FOR CONSULTATION: Tachycardia (Pt states \"I woke up several times with my heart racing during the night\") and Shortness of Breath (Progressive over 1-2months with exertion)      HPI: Mr. Ming Delatorre is a 56 y.o. male with no prior cardiac history who was admitted to the hospital because of worsening shortness of breath, currently treated for acute on chronic combined CHF, NYHA class III. Patient has history of hypertension and dyslipidemia. He did not see any doctor for the past 9 years. Not on any medications at home. History of smoking. He smoked 2 packs a day and quit in . Denies any history of diabetes. He reported no significant family history of premature CAD. He woke up this morning short of breath and was heart racing. He is complaining of progressive shortness of breath on exertion since the summer. He has bilateral lower extremity swelling that is getting worse over the past couple weeks. He is short of breath on mild exertion and this is the first time he is short of breath at rest.  Work-up in the emergency room showed elevated BNP, evidence of pulmonary vascular congestion and sinus tachycardia and ECG with nonspecific ST segment changes. I reviewed his echo on 10/21/2020, ejection fraction 35%. Severe global hypokinesis with no segmental abnormalities. Moderate diastolic dysfunction. No significant valvular abnormalities. He is feeling a little better this morning. Still cannot sleep well at night. Has negative fluid balance of 1800. No chest pain, pressure or tightness. Found to have hypothyroidism started on Synthroid therapy. Going for cardiac catheterization today.     Past Medical History:   Diagnosis Date    Chronic combined systolic and diastolic CHF (congestive heart failure) [address] [address]     History of echocardiogram 10/21/2020    EF 30%    Hypertension     Hypothyroidism        CURRENT ALLERGIES: Patient has no known allergies. REVIEW OF SYSTEMS: 14 systems were reviewed. Pertinent positives and negatives as above, all else negative. History reviewed. No pertinent surgical history. Social History:  Social History     Tobacco Use    Smoking status: Former Smoker     Last attempt to quit: 2011     Years since quittin.8    Smokeless tobacco: Never Used   Substance Use Topics    Alcohol use: Yes     Comment: 4 shots of whiskey daily    Drug use: Never        CURRENT MEDICATIONS:  No outpatient medications have been marked as taking for the 10/21/20 encounter [facility] Encounter). sodium chloride flush 0.9 % injection 10 mL, 2 times per day  sodium chloride flush 0.9 % injection 10 mL, PRN  acetaminophen (TYLENOL) tablet 650 mg, Q6H PRN    Or  acetaminophen (TYLENOL) suppository 650 mg, Q6H PRN  polyethylene glycol (GLYCOLAX) packet 17 g, Daily PRN  promethazine (PHENERGAN) tablet 12.5 mg, Q6H PRN    Or  ondansetron (ZOFRAN) injection 4 mg, Q6H PRN  famotidine (PEPCID) tablet 20 mg, BID  enoxaparin (LOVENOX) injection 40 mg, Daily  losartan (COZAAR) tablet 25 mg, Daily  metoprolol succinate (TOPROL XL) extended release tablet 25 mg, Daily  furosemide (LASIX) injection 40 mg, Daily  levothyroxine (SYNTHROID) tablet 150 mcg, Daily         FAMILY HISTORY: Reviewed, no significant family history of premature CAD. PHYSICAL EXAM:   BP (!) 140/92   Pulse 91   Temp 97.1 °F (36.2 °C) (Temporal)   Resp 16   Ht 5' 11\" (1.803 m)   Wt 261 lb (118.4 kg)   SpO2 97%   BMI 36.40 kg/m²  Body mass index is 36.4 kg/m². Constitutional: He is oriented to person, place, and time. He appears well-developed and well-nourished. In no acute distress. HEENT: Normocephalic and atraumatic. No JVD present. Carotid bruit is not present.  No mass and no thyromegaly present. No lymphadenopathy present. Cardiovascular: Tachycardic rate, regular rhythm, normal heart sounds. Exam reveals no gallop and no friction rubs. No heart murmur heard. Pulmonary/Chest: Effort normal and breath sounds normal. No respiratory distress. He has no wheezes, rhonchi or rales. Abdominal: Soft, non-tender. Bowel sounds and aorta are normal. He exhibits no organomegaly, mass or bruit. Extremities: 1+ lower extremity pitting pedal edema. 1/2 way up to the knees bilaterally No cyanosis and no clubbing. Pulses are 2+ radial and carotid pulses. 2+ dorsalis pedis and posterior tibial pulses bilaterally. Neurological: He is alert and oriented to person, place, and time. No evidence of gross cranial nerve deficit. Coordination appeared normal.   Skin: Skin is warm and dry. There is no rash or diaphoresis. Psychiatric: He has a normal mood and affect. His speech is normal and behavior is normal.      MOST RECENT LABS ON RECORD:   Lab Results   Component Value Date    WBC 8.2 10/22/2020    HGB 14.6 10/22/2020    HCT 43.6 10/22/2020     (L) 10/22/2020     (L) 10/22/2020    K 4.0 10/22/2020    CL 93 (L) 10/22/2020    CREATININE 0.62 (L) 10/22/2020    BUN 10 10/22/2020    CO2 26 10/22/2020    TSH 49.88 (H) 10/21/2020        ASSESSMENT:  Patient Active Problem List    Diagnosis Date Noted    CHF (congestive heart failure) ([address] 10/21/2020     Priority: Low    Acute on chronic combined systolic and diastolic CHF, NYHA class 3 ([address] 10/21/2020     Priority: Low    Hypothyroid 10/21/2020     Priority: Low       PLAN:    Patient presented to the hospital with worsening shortness of breath, found to have acute on chronic combined CHF with ejection fraction of 35%. Suspect hypertensive heart failure. Patient has history of hypertension, not taking any medicine for the past 9 years. History of dyslipidemia. He also has a long history of smoking.   Complaining of chest tightness along with sinus tachycardia nonspecific ST segment abnormalities on the EKG. Because two thirds of the cases with low ejection fraction along with his history of chest tightness and multiple risk factors for CAD can be caused by coronary artery disease, I think it is a must to get a cardiac catheterization to differentiate between ischemic and nonischemic cardiomyopathy. Change Toprol-XL to carvedilol 12.5 mg daily for better control of blood pressure. Continue losartan. Increase Lasix to 40 mg twice daily  Daily weight, low-salt diet and strict input/output. Closely monitor kidney function and electrolytes. Heart catheterization with coronary angiography:  I discussed the risks, benefits, and alternatives to the procedure including the risk of bleeding, contrast induced allergy and/or kidney damage, arrythmia, stroke, heart attack, death, or the need for further procedures. I also discussed the fact that although treatment with simple medical management is a potential treatment option in place of cardiac catheterization, I expressed my opinion that cardiac catheterization in order to define his coronary anatomy and rule out severe 3 vessel or left main coronary artery disease would significant help guide the most appropriate treatment strategy ranging from no treatment to medications, to stents, to even bypass surgery. Mr. Ryan Rowalnd verbalized understanding of the risks benefits and alternatives and stated that he would like to proceed with heart catheterization. I also discussed the advantages and disadvantages of having his procedure performed here at [facility] vs. a larger hospital such as [facility]. Beyond the obvious advantage of convenience, I also explained potential disadvantages including the inability to have immediate cardiac stenting performed or the presence of on site CT surgical backup.  However, because of the lack of significant high risk feature on his stress test, I did tell him I thought that in his particular case, it would likely be safe to perform the procedure here. Therefore, after considering the options, Mr. Dwaine Pennington said he would prefer to have the procedure performed here at [facility] and so I scheduled the coronary angiography for tomorrow morning. .    I told his wife that her concern regarding him having obstructive sleep apnea syndrome is reasonable and we will test him for sleep apnea as an outpatient after improvement of his volume status. Morbid obesity: Body mass index is 36.4 kg/m². I also briefly discussed both diet and exercise strategies for him to continue to loses weight and he was very receptive to this. Once again, thank you for allowing me to participate in this patients care. Please do not hesitate to contact me if I could be of any further assistance. Sincerely,  Kandice Coronado MD, MS, F.A.C.C. [facility], [address], [facility], [place] [address], [address] [facility]  Phone: [phone], Fax: [phone]      I believe that the risk of significant morbidity and mortality related to the patient's current medical conditions are: intermediate-high. The documentation recorded by the scribe, accurately and completely reflects the services I personally performed and the decisions made by me. Kandice Coronado MD, F.A.C.C.  October 22, 2020

## 2020-10-22 NOTE — PLAN OF CARE
Problem: Pain:  Goal: Pain level will decrease  Description: Pain level will decrease  10/22/2020 1120 by Evette Michaels RN  Outcome: Ongoing  Note: Pain assessed every 4 hours. Patient is able to communicate with staff the need for pain interventions. Patient currently not complaining of any pain. Pain medications available if needed. Will continue to monitor. Problem: Pain:  Goal: Control of acute pain  Description: Control of acute pain  10/22/2020 1120 by Evette Michaels RN  Outcome: Ongoing     Problem: Pain:  Goal: Control of chronic pain  Description: Control of chronic pain  10/22/2020 1120 by Evette Michaels RN  Outcome: Ongoing     Problem: Falls - Risk of:  Goal: Will remain free from falls  Description: Will remain free from falls  10/22/2020 1120 by Evette Michaels RN  Outcome: Ongoing  Note: Fall assessment completed daily. Bed in lowest position. Call light within reach. Falling star posted to outside of door. Fall risk sticker in place on ID band. Side rails up 2/4. Patient ambulates independently in the room. Will continue to monitor. Problem: Falls - Risk of:  Goal: Absence of physical injury  Description: Absence of physical injury  10/22/2020 1120 by Evette Michaels RN  Outcome: Ongoing  Note: Patient is free from physical injury. Problem: OXYGENATION/RESPIRATORY FUNCTION  Goal: Patient will maintain patent airway  10/22/2020 1120 by Evette Michaels RN  Outcome: Ongoing  Note: Patient is awake and alert, able to cough and clear secretions if needed. Will continue to monitor. Problem: OXYGENATION/RESPIRATORY FUNCTION  Goal: Patient will achieve/maintain normal respiratory rate/effort  Description: Respiratory rate and effort will be within normal limits for the patient  10/22/2020 1120 by Evette Michaels RN  Outcome: Ongoing  Note: See respiratory assessment.       Problem: HEMODYNAMIC STATUS  Goal: Patient has stable vital signs and fluid balance  10/22/2020 1120 by Evette Michaels RN  Outcome: Ongoing  Note: Will monitor vital signs and intake and output. Problem: FLUID AND ELECTROLYTE IMBALANCE  Goal: Fluid and electrolyte balance are achieved/maintained  10/22/2020 1120 by Lin Mendes RN  Outcome: Ongoing  Note: Will monitor intake and output. Will monitor labs. Problem: ACTIVITY INTOLERANCE/IMPAIRED MOBILITY  Goal: Mobility/activity is maintained at optimum level for patient  10/22/2020 1120 by Lin Mendes RN  Outcome: Ongoing  Note: Patient ambulates independently in the room.       Problem: Nutrition  Goal: Optimal nutrition therapy  Description: Nutrition Problem #1: Altered nutrition-related lab values  Intervention: Food and/or Nutrient Delivery: Continue Current Diet  Nutritional Goals: >75% of meals      10/22/2020 1120 by Lin Mednes RN  Outcome: Ongoing

## 2020-10-22 NOTE — PROGRESS NOTES
Patient returned back from procedure. Patient denies any pain at this time. Right wrist has elastoplast x3 layers and gauze in place for pressure dressing, no s/s of bleeding noted.  Will continue to monitor

## 2020-10-22 NOTE — PROGRESS NOTES
Second layer of pressure dressing to right wrist removed at this time. No bleeding or swelling noted. Will removed third layer shortly.

## 2020-10-22 NOTE — PROGRESS NOTES
Patient is visibly tense and apprehensive about getting the heart cath and when he will get to go home. Writer offered him written education and encouraged questions be asked about the procedure. Writer also encouraged patient to verbalize his feelings and concerns. Patient refused the education pack but did ask for a short explanation of what the procedure would be this morning. Writer provided verbal education and offered the written information again but patient refused.

## 2020-10-22 NOTE — PROGRESS NOTES
Delvin Leslie M.D. Internal Medicine Progress Note 10/22/20    SUBJECTIVE:    Patient seen for f/u of Acute on chronic combined systolic and diastolic CHF, NYHA class 3 ([address] He denies chest pain today. Did not sleep well and is anxious about his heart cath. Denies SOB at rest.      ROS:   Constitutional: negative  for fevers, and negative for chills. Respiratory: negative for shortness of breath, negative for cough, and negative for wheezing  Cardiovascular: negative for chest pain, and negative for palpitations  Gastrointestinal: negative for abdominal pain, negative for nausea,negative for vomiting, negative for diarrhea, and negative for constipation     All other systems were reviewed with the patient and are negative unless otherwise stated in HPI    OBJECTIVE:  Vitals:   Temp: 97.1 °F (36.2 °C)  BP: (!) 140/92  Resp: 16  Pulse: 91  SpO2: 97 %    24HR INTAKE/OUTPUT:      Intake/Output Summary (Last 24 hours) at 10/22/2020 0756  Last data filed at 10/22/2020 0705  Gross per 24 hour   Intake --   Output 1850 ml   Net -1850 ml     -----------------------------------------------------------------  Exam:  GEN:    Awake, alert and oriented x3. EYES:  EOMI, pupils equal   NECK: Supple. No lymphadenopathy. No carotid bruit  CVS:    regular rate and rhythm, systolic murmur  PULM:  CTA, no wheezes, rales or rhonchi, no acute respiratory distress  ABD:    Bowels sounds normal.  Abdomen is soft. No distention. no tenderness to palpation. EXT:   1+ edema bilaterally . No calf tenderness. NEURO: Moves all extremities. Motor and sensory are grossly intact  SKIN:  No rashes.   No skin lesions.    -----------------------------------------------------------------  Diagnostic Data:    · All available data reviewed  Lab Results   Component Value Date    WBC 8.2 10/22/2020    HGB 14.6 10/22/2020    .1 (H) 10/22/2020     (L) 10/22/2020      Lab Results   Component Value Date    GLUCOSE 118 (H) 10/22/2020    BUN 10 10/22/2020    CREATININE 0.62 (L) 10/22/2020     (L) 10/22/2020    K 4.0 10/22/2020    CALCIUM 8.8 10/22/2020    CL 93 (L) 10/22/2020    CO2 26 10/22/2020       PROBLEM LIST:  Principal Problem:    Acute on chronic combined systolic and diastolic CHF, NYHA class 3 ([facility])  Active Problems:    CHF (congestive heart failure) ([facility])    Hypothyroid  Resolved Problems:    * No resolved hospital problems. *      ASSESSMENT / PLAN:  · Acute on chronic combined systolic and diastolic CHF, NYHA class 3 ([facility] [address]  ? Appreciate Cardiology consult  ? Diuresis  ? Plan for cath this AM  · Hypertension  ? Continue Losartan   · Hypothyroidism with elevated TSH  ?  Synthroid initiated  · DVT prophylaxis: Lovenox   · High risk medications: none   · Disposition:  Discharge plan is home    Leana Mcardle , M.D.  10/22/2020  7:56 AM

## 2020-10-22 NOTE — PROGRESS NOTES
Patient arrived back on floor at this time from cath lab. Vitals obtained. Patient sitting on edge of bed eating lunch. Will monitor patient.

## 2020-10-22 NOTE — PROGRESS NOTES
First layer of pressure dressing to right wrist removed at this time. No bleeding or swelling noted to the area. Will remove second layer in 15--20 minutes.

## 2020-10-22 NOTE — PROGRESS NOTES
by: Arleth Tidwell on 10/22/2020 1:26 PM      Electronically signed by:  Guille Zimmerman MD 10/22/2020 3:55 PM

## 2020-10-22 NOTE — PLAN OF CARE
Problem: Pain:  Goal: Pain level will decrease  Description: Pain level will decrease  Outcome: Ongoing  Goal: Control of acute pain  Description: Control of acute pain  Outcome: Ongoing  Goal: Control of chronic pain  Description: Control of chronic pain  Outcome: Ongoing     Problem: Falls - Risk of:  Goal: Will remain free from falls  Description: Will remain free from falls  Outcome: Ongoing  Goal: Absence of physical injury  Description: Absence of physical injury  Outcome: Ongoing     Problem: OXYGENATION/RESPIRATORY FUNCTION  Goal: Patient will maintain patent airway  Outcome: Ongoing  Goal: Patient will achieve/maintain normal respiratory rate/effort  Description: Respiratory rate and effort will be within normal limits for the patient  Outcome: Ongoing     Problem: HEMODYNAMIC STATUS  Goal: Patient has stable vital signs and fluid balance  Outcome: Ongoing     Problem: FLUID AND ELECTROLYTE IMBALANCE  Goal: Fluid and electrolyte balance are achieved/maintained  Outcome: Ongoing     Problem: ACTIVITY INTOLERANCE/IMPAIRED MOBILITY  Goal: Mobility/activity is maintained at optimum level for patient  Outcome: Ongoing     Problem: Nutrition  Goal: Optimal nutrition therapy  Description: Nutrition Problem #1: Altered nutrition-related lab values  Intervention: Food and/or Nutrient Delivery: Continue Current Diet  Nutritional Goals: >75% of meals      Outcome: Ongoing

## 2020-10-22 NOTE — PROGRESS NOTES
Third layer of pressure dressing removed at this time. No bleeding or swelling noted, site clean, dry and intact. Pulse palpable, capillary refill less than 3 seconds. Patient reminded to not put any weight on his right wrist and to only use it for basic ADL's. Patient verbalizes understanding. Will continue to monitor.

## 2020-10-22 NOTE — PROGRESS NOTES
Pressure dressing to the right wrist loosened at this time, no s/s of bleeding noted, will continue to monitor

## 2020-10-23 VITALS
OXYGEN SATURATION: 97 % | BODY MASS INDEX: 36.82 KG/M2 | HEART RATE: 80 BPM | DIASTOLIC BLOOD PRESSURE: 85 MMHG | HEIGHT: 71 IN | RESPIRATION RATE: 16 BRPM | WEIGHT: 263 LBS | TEMPERATURE: 97.5 F | SYSTOLIC BLOOD PRESSURE: 138 MMHG

## 2020-10-23 LAB
ANION GAP SERPL CALCULATED.3IONS-SCNC: 16 MMOL/L (ref 9–17)
BUN BLDV-MCNC: 12 MG/DL (ref 8–23)
BUN/CREAT BLD: 20 (ref 9–20)
CALCIUM SERPL-MCNC: 8.3 MG/DL (ref 8.6–10.4)
CHLORIDE BLD-SCNC: 95 MMOL/L (ref 98–107)
CO2: 22 MMOL/L (ref 20–31)
CREAT SERPL-MCNC: 0.6 MG/DL (ref 0.7–1.2)
GFR AFRICAN AMERICAN: >60 ML/MIN
GFR NON-AFRICAN AMERICAN: >60 ML/MIN
GFR SERPL CREATININE-BSD FRML MDRD: ABNORMAL ML/MIN/{1.73_M2}
GFR SERPL CREATININE-BSD FRML MDRD: ABNORMAL ML/MIN/{1.73_M2}
GLUCOSE BLD-MCNC: 103 MG/DL (ref 70–99)
MAGNESIUM: 1.9 MG/DL (ref 1.6–2.6)
POTASSIUM SERPL-SCNC: 3.6 MMOL/L (ref 3.7–5.3)
SODIUM BLD-SCNC: 133 MMOL/L (ref 135–144)

## 2020-10-23 PROCEDURE — 99233 SBSQ HOSP IP/OBS HIGH 50: CPT | Performed by: INTERNAL MEDICINE

## 2020-10-23 PROCEDURE — 6360000002 HC RX W HCPCS: Performed by: FAMILY MEDICINE

## 2020-10-23 PROCEDURE — 6370000000 HC RX 637 (ALT 250 FOR IP): Performed by: FAMILY MEDICINE

## 2020-10-23 PROCEDURE — 36415 COLL VENOUS BLD VENIPUNCTURE: CPT

## 2020-10-23 PROCEDURE — 2580000003 HC RX 258: Performed by: FAMILY MEDICINE

## 2020-10-23 PROCEDURE — 80048 BASIC METABOLIC PNL TOTAL CA: CPT

## 2020-10-23 PROCEDURE — 83735 ASSAY OF MAGNESIUM: CPT

## 2020-10-23 RX ORDER — LOSARTAN POTASSIUM 25 MG/1
25 TABLET ORAL DAILY
Qty: 30 TABLET | Refills: 3 | Status: SHIPPED | OUTPATIENT
Start: 2020-10-24 | End: 2020-11-04 | Stop reason: ALTCHOICE

## 2020-10-23 RX ORDER — POTASSIUM CHLORIDE 20 MEQ/1
20 TABLET, EXTENDED RELEASE ORAL DAILY
Qty: 30 TABLET | Refills: 5 | Status: SHIPPED | OUTPATIENT
Start: 2020-10-23 | End: 2022-06-13 | Stop reason: SDUPTHER

## 2020-10-23 RX ORDER — FUROSEMIDE 40 MG/1
40 TABLET ORAL DAILY
Qty: 30 TABLET | Refills: 3 | Status: SHIPPED | OUTPATIENT
Start: 2020-10-23 | End: 2021-02-26 | Stop reason: SDUPTHER

## 2020-10-23 RX ORDER — LEVOTHYROXINE SODIUM 0.15 MG/1
150 TABLET ORAL DAILY
Qty: 30 TABLET | Refills: 3 | Status: SHIPPED | OUTPATIENT
Start: 2020-10-24 | End: 2021-02-26 | Stop reason: SDUPTHER

## 2020-10-23 RX ORDER — CARVEDILOL 12.5 MG/1
12.5 TABLET ORAL 2 TIMES DAILY WITH MEALS
Qty: 60 TABLET | Refills: 3 | Status: SHIPPED | OUTPATIENT
Start: 2020-10-23 | End: 2021-02-26 | Stop reason: SDUPTHER

## 2020-10-23 RX ADMIN — ENOXAPARIN SODIUM 40 MG: 40 INJECTION, SOLUTION INTRAVENOUS; SUBCUTANEOUS at 08:41

## 2020-10-23 RX ADMIN — CARVEDILOL 12.5 MG: 12.5 TABLET, FILM COATED ORAL at 07:53

## 2020-10-23 RX ADMIN — FAMOTIDINE 20 MG: 20 TABLET, FILM COATED ORAL at 08:41

## 2020-10-23 RX ADMIN — LOSARTAN POTASSIUM 25 MG: 25 TABLET ORAL at 08:41

## 2020-10-23 RX ADMIN — LEVOTHYROXINE SODIUM 150 MCG: 150 TABLET ORAL at 07:53

## 2020-10-23 RX ADMIN — FUROSEMIDE 40 MG: 10 INJECTION, SOLUTION INTRAMUSCULAR; INTRAVENOUS at 08:41

## 2020-10-23 RX ADMIN — Medication 10 ML: at 08:41

## 2020-10-23 ASSESSMENT — PAIN SCALES - GENERAL: PAINLEVEL_OUTOF10: 0

## 2020-10-23 NOTE — PROGRESS NOTES
Please make sure to call in for transition of care. I ordered sleep study to be done as an outpatient. He has to get blood work prior to his next visit.   Thank you

## 2020-10-23 NOTE — DISCHARGE SUMMARY
None. HISTORY: ORDERING SYSTEM PROVIDED HISTORY: tachy, sob, weak + d-dimer TECHNOLOGIST PROVIDED HISTORY: tachy, sob, weak + d-dimer FINDINGS: Pulmonary Arteries: Pulmonary arteries are adequately opacified for evaluation. No evidence of intraluminal filling defect to suggest pulmonary embolism. Main pulmonary artery is normal in caliber. Mediastinum: No evidence of mediastinal lymphadenopathy. There is a trace pericardial effusion. There is no acute abnormality of the thoracic aorta. Lungs/pleura: The lungs are without acute process. No focal consolidation or pulmonary edema. No evidence of pleural effusion or pneumothorax. Upper Abdomen: There is hepatic steatosis. Soft Tissues/Bones: No acute bone or soft tissue abnormality. No evidence of pulmonary embolism or acute pulmonary abnormality. Trace pericardial effusion. Hepatic steatosis. Assessment and Plan:  Patient Active Problem List    Diagnosis Date Noted    Idiopathic cardiomyopathy ([facility] 10/22/2020     Priority: High    CHF (congestive heart failure) ([facility] [facility] 10/21/2020    Acute on chronic combined systolic and diastolic CHF, NYHA class 3 ([facility] [facility] 10/21/2020    Hypothyroid 10/21/2020        Discharge Medications:       Dhiraj Leon   Louisa Medication Instructions [id number]    Printed on:10/23/20 0980   Medication Information                      carvedilol (COREG) 12.5 MG tablet  Take 1 tablet by mouth 2 times daily (with meals)             furosemide (LASIX) 40 MG tablet  Take 1 tablet by mouth daily             levothyroxine (SYNTHROID) 150 MCG tablet  Take 1 tablet by mouth Daily             losartan (COZAAR) 25 MG tablet  Take 1 tablet by mouth daily             potassium chloride (KLOR-CON M) 20 MEQ extended release tablet  Take 1 tablet by mouth daily                 Patient Instructions:    Activity: activity as tolerated  Diet: cardiac diet  Wound Care: none needed  Other: None     Disposition:   Discharge to Home    Follow up:  Patient will be followed by Hector Leos MD in 1-2 weeks    CORE MEASURES on Discharge (if applicable)  ACE/ARB in CHF: Yes  Statin in MI: NA  ASA in MI: NA  Statin in CVA: NA  Antiplatelet in CVA: NA    Total time spent on discharge services: 45 minutes    Including the following activities:  Evaluation and Management of patient  Discussion with patient and/or surrogate about current care plan  Coordination with Case Management and/or   Coordination of care with Consultants (if applicable)   Coordination of care with Receiving Facility Physician (if applicable)  Completion of DME forms (if applicable)  Preparation of Discharge Summary  Preparation of Medication Reconciliation  Preparation of Discharge Prescriptions    Signed:  CORAL Lowe, NP-C  10/23/2020, 9:36 AM

## 2020-10-23 NOTE — PROGRESS NOTES
Discharge instructions reviewed with pt. All questions answered. Will escort pt to private car shortly.

## 2020-10-23 NOTE — PROGRESS NOTES
Pt sitting up in chair watching television. Respirations even and unlabored while on room air. Vitals obtained and morning assessment completed. 0700 and 0800 medications given, pt took without difficulty. Pt denies other needs from writer at this time. Call light in reach. Will continue to monitor.

## 2020-10-23 NOTE — CONSULTS
[address]   Heart Failure Clinic Nurse for Dr. Collette Stairs and Dr. Eleni Stanley [address]  Phone# [phone]  Fax# [phone]        Diagnosis: Acute on chronic combined heart failure       Mr. Ryan Rowland came in to the emergency department on 10/23/20 due to increased shortness of breath and chest pain that awoke up during the night. He reports not seeing a doctor for over nine years. His echocardiogram on 10/21/20 showed an ejection fraction of 30%  with a mild diastolic dysfunction seen. He reports that before all of this had occurred he was able to do his own yard work and mow his own yard, but says he needs to increase his activity level because he does not get much exercise at home. He does do all the cooking at home for him and his wife. Mr. Ryan Rowland says today he has been feeling better. He denies any lightheadedness, dizziness, shortness of breath, palpitations, or chest discomfort. ASSESSMENT    /85   Pulse 80   Temp 97.5 °F (36.4 °C) (Temporal)   Resp 16   Ht 5' 11\" (1.803 m)   Wt 263 lb (119.3 kg)   SpO2 97%   BMI 36.68 kg/m²  Body mass index is 36.68 kg/m². Constitutional: He appeared oriented to person and place. He appears well-developed and well-nourished. In no acute distress. HEENT: No JVD present. Cardiovascular: Normal rate, regular rhythm, normal heart sounds. Exam reveals no gallop and no friction rubs. Pulmonary/Chest: Effort normal and breath sounds normal. No respiratory distress. He has no wheezes, rhonchi or rales. Abdominal: Soft, non-tender. Extremities: Trace-1+ 1/2 up to the knees bilaterally. No cyanosis or clubbing. 2+ radial and carotid pulses. Distal extremity pulses: 2+ bilaterally. Compression stockings in place. Neurological: Alert and oriented to person place time and situation. Coordination appeared normal.   Skin: Skin is warm and dry. There is no rash or diaphoresis. Psychiatric: He has a normal mood and affect. His speech is normal and behavior is normal.         Progress Note  I discussed findings with Dr. Anthony Cervantes. Patient Education/Instructions: I did spend about 45 minutes with the Mr. Xin Contreras going over his heart failure packet including dietary guidelines including a low sodium diet and what food types are considered low in sodium. I also went over the heart failure zones including the signs and symptoms to watch for including shortness of breath, weight gain, lightheadedness/dizziness, chest pressure or tightness, increased cough, feeling of uneasiness and the importance of daily weights, and to keep her legs elevated as much as possible. I asked him to call the office if he were to develop persistent or worsening shortness of breath or weight gain of more than 3 pounds in 1-7 days. Mr. Xin Contreras verbalized understanding. Follow Up:  Appointment has been scheduled for 11/4/20 to see Dr. Anthony Cervantes and I in the heart failure clinic for follow up. I will plan to call him next week  for a follow up call to see how he is feeling including him daily weights, medication compliance, and if any swelling or symptoms.        Katarina Rocha RN   Heart Failure Clinic Nurse   [address], [facility] [address]

## 2020-10-23 NOTE — PROGRESS NOTES
Reji Whitman M.D. Internal Medicine Progress Note 10/23/20    SUBJECTIVE:    Patient seen for f/u of Idiopathic cardiomyopathy ([address] He denies chest pain, palpitations or SOB. LE swelling is improved. ROS:   Constitutional: negative  for fevers, and negative for chills. Respiratory: negative for shortness of breath, negative for cough, and negative for wheezing  Cardiovascular: negative for chest pain, and negative for palpitations  Gastrointestinal: negative for abdominal pain, negative for nausea,negative for vomiting, negative for diarrhea, and negative for constipation     All other systems were reviewed with the patient and are negative unless otherwise stated in HPI    OBJECTIVE:  Vitals:   Temp: 97.5 °F (36.4 °C)  BP: 138/85  Resp: 16  Pulse: 80  SpO2: 97 %    24HR INTAKE/OUTPUT:      Intake/Output Summary (Last 24 hours) at 10/23/2020 0752  Last data filed at 10/23/2020 0252  Gross per 24 hour   Intake 1970 ml   Output 1375 ml   Net 595 ml     -----------------------------------------------------------------  Exam:  GEN:    Awake, alert and oriented x3. EYES:  EOMI, pupils equal   NECK: Supple. No lymphadenopathy. No carotid bruit  CVS:    regular rate and rhythm, no audible murmur  PULM:  CTA, no wheezes, rales or rhonchi, no acute respiratory distress  ABD:    Bowels sounds normal.  Abdomen is soft. No distention. no tenderness to palpation. EXT:   1+ edema bilaterally . No calf tenderness. NEURO: Moves all extremities. Motor and sensory are grossly intact  SKIN:  No rashes.   No skin lesions.    -----------------------------------------------------------------  Diagnostic Data:    · All available data reviewed  Lab Results   Component Value Date    WBC 8.2 10/22/2020    HGB 14.6 10/22/2020    .1 (H) 10/22/2020     (L) 10/22/2020      Lab Results   Component Value Date    GLUCOSE 103 (H) 10/23/2020    BUN 12 10/23/2020    CREATININE 0.60 (L) 10/23/2020     (L) 10/23/2020    K 3.6 (L) 10/23/2020    CALCIUM 8.3 (L) 10/23/2020    CL 95 (L) 10/23/2020    CO2 22 10/23/2020       Coronary Angiography Brief Post Operative Note:     Moderate single vessel coronary artery disease involving a 50% stenosis in the left anterior descending coronary artery. Normal left ventricular end diastolic pressure. Proceed with guideline directed maximal medical management for what appears to be a non-ischemic cardiomyopathy. PROBLEM LIST:  Principal Problem:    Idiopathic cardiomyopathy ([facility]  Active Problems:    CHF (congestive heart failure) (HCC)    Acute on chronic combined systolic and diastolic CHF, NYHA class 3 (HCC)    Hypothyroid  Resolved Problems:    * No resolved hospital problems.  *      ASSESSMENT / PLAN:  Idiopathic cardiomyopathy ([facility]  · Appreciate cardiology assistance  · Continue Losartan, Carvedilol  · PO lasix at DC  · Hypertension  · Hypothyroidism  · Synthroid initiated  · Recheck TSH, Free T3 and Free T4 in 6 weeks  · Nutrition status: obesity, non-morbid  · DVT prophylaxis: Lovenox   · High risk medications: none   · Disposition:  Discharge plan is home    Noemi Shone , M.D.  10/23/2020  7:52 AM

## 2020-10-23 NOTE — PROGRESS NOTES
Patient: Rigoberto Dick  : 1960  Date of Admission: 10/21/2020  Primary Care Physician: Zina Gallo  Today's Date: 10/23/2020    REASON FOR CONSULTATION: Tachycardia (Pt states \"I woke up several times with my heart racing during the night\") and Shortness of Breath (Progressive over 1-2months with exertion)      HPI: Mr. Ming Delatorre is a 56 y.o. male with no prior cardiac history who was admitted to the hospital because of worsening shortness of breath, currently treated for acute on chronic combined CHF, NYHA class III. Patient has history of hypertension and dyslipidemia. He did not see any doctor for the past 9 years. Not on any medications at home. History of smoking. He smoked 2 packs a day and quit in . Denies any history of diabetes. He reported no significant family history of premature CAD. He woke up 10/21/2020 with short of breath and was heart racing. He is complaining of progressive shortness of breath on exertion since the summer. He has bilateral lower extremity swelling that is getting worse over the past couple weeks. He is short of breath on mild exertion and this is the first time he is short of breath at rest.  Work-up in the emergency room showed elevated BNP, evidence of pulmonary vascular congestion and sinus tachycardia and ECG with nonspecific ST segment changes. I reviewed his echo on 10/21/2020, ejection fraction 35%. Severe global hypokinesis with no segmental abnormalities. Moderate diastolic dysfunction. No significant valvular abnormalities. S/P cardiac catheterization on 10/22/2020: Nonobstructive CAD. Patient currently treated for nonischemic cardiomyopathy which is probably secondary to hypertensive heart disease. He also found to have hypothyroidism and started on thyroid replacement therapy. This morning he told me he is feeling great. He slept good throughout the night. He denied any chest pain, pressure or tightness.   No problems with current medications. Blood pressure is very well controlled. Labs reviewed, serum potassium 3.6. We will discharge him on oral Lasix and 20 mEq of potassium chloride. He has classic history of obstructive sleep apnea syndrome I will order outpatient sleep study. Our heart failure nurse educated him about lifestyle changes, dietary modification and exercise. Past Medical History:   Diagnosis Date    Chronic combined systolic and diastolic CHF (congestive heart failure) ([address]     History of echocardiogram 10/21/2020    EF 30%    Hypertension     Hypothyroidism        CURRENT ALLERGIES: Patient has no known allergies. REVIEW OF SYSTEMS: 14 systems were reviewed. Pertinent positives and negatives as above, all else negative.      Past Surgical History:   Procedure Laterality Date    CARDIAC CATHETERIZATION Left 10/22/2020    [facility]    Social History:  Social History     Tobacco Use    Smoking status: Former Smoker     Last attempt to quit: 2011     Years since quittin.8    Smokeless tobacco: Never Used   Substance Use Topics    Alcohol use: Yes     Comment: 4 shots of whiskey daily    Drug use: Never        CURRENT MEDICATIONS:  Outpatient Medications Marked as Taking for the 10/21/20 encounter [facility] Encounter)   Medication Sig Dispense Refill    [START ON 10/24/2020] losartan (COZAAR) 25 MG tablet Take 1 tablet by mouth daily 30 tablet 3    carvedilol (COREG) 12.5 MG tablet Take 1 tablet by mouth 2 times daily (with meals) 60 tablet 3    furosemide (LASIX) 40 MG tablet Take 1 tablet by mouth daily 30 tablet 3    [START ON 10/24/2020] levothyroxine (SYNTHROID) 150 MCG tablet Take 1 tablet by mouth Daily 30 tablet 3    potassium chloride (KLOR-CON M) 20 MEQ extended release tablet Take 1 tablet by mouth daily 30 tablet 5       0.9 % sodium chloride infusion, Continuous  sodium chloride flush 0.9 % injection 10 mL, 2 times per day  sodium chloride flush 0.9 % injection 10 mL, PRN  diphenhydrAMINE (BENADRYL) capsule 50 mg, Once  nitroGLYCERIN (NITROSTAT) SL tablet 0.4 mg, Q5 Min PRN  0.9 % sodium chloride infusion, Continuous  acetaminophen (TYLENOL) tablet 650 mg, Q4H PRN  furosemide (LASIX) injection 40 mg, BID  carvedilol (COREG) tablet 12.5 mg, BID WC  sodium chloride flush 0.9 % injection 10 mL, 2 times per day  sodium chloride flush 0.9 % injection 10 mL, PRN  acetaminophen (TYLENOL) tablet 650 mg, Q6H PRN    Or  acetaminophen (TYLENOL) suppository 650 mg, Q6H PRN  polyethylene glycol (GLYCOLAX) packet 17 g, Daily PRN  promethazine (PHENERGAN) tablet 12.5 mg, Q6H PRN    Or  ondansetron (ZOFRAN) injection 4 mg, Q6H PRN  famotidine (PEPCID) tablet 20 mg, BID  enoxaparin (LOVENOX) injection 40 mg, Daily  losartan (COZAAR) tablet 25 mg, Daily  levothyroxine (SYNTHROID) tablet 150 mcg, Daily         FAMILY HISTORY: Reviewed, no significant family history of premature CAD. PHYSICAL EXAM:   /85   Pulse 80   Temp 97.5 °F (36.4 °C) (Temporal)   Resp 16   Ht 5' 11\" (1.803 m)   Wt 263 lb (119.3 kg)   SpO2 97%   BMI 36.68 kg/m²  Body mass index is 36.68 kg/m². Constitutional: He is oriented to person, place, and time. He appears well-developed and well-nourished. In no acute distress. HEENT: Normocephalic and atraumatic. No JVD present. Carotid bruit is not present. No mass and no thyromegaly present. No lymphadenopathy present. Cardiovascular: Tachycardic rate, regular rhythm, normal heart sounds. Exam reveals no gallop and no friction rubs. No heart murmur heard. Pulmonary/Chest: Effort normal and breath sounds normal. No respiratory distress. He has no wheezes, rhonchi or rales. Abdominal: Soft, non-tender. Bowel sounds and aorta are normal. He exhibits no organomegaly, mass or bruit. Extremities: Trace lower extremity edema. 1/2 way up to the knees bilaterally No cyanosis and no clubbing. Pulses are 2+ radial and carotid pulses.  2+ dorsalis pedis and posterior tibial pulses bilaterally. Neurological: He is alert and oriented to person, place, and time. No evidence of gross cranial nerve deficit. Coordination appeared normal.   Skin: Skin is warm and dry. There is no rash or diaphoresis. Psychiatric: He has a normal mood and affect. His speech is normal and behavior is normal.      MOST RECENT LABS ON RECORD:   Lab Results   Component Value Date    WBC 8.2 10/22/2020    HGB 14.6 10/22/2020    HCT 43.6 10/22/2020     (L) 10/22/2020    CHOL 188 10/22/2020    TRIG 123 10/22/2020    HDL 54 10/22/2020    LDLCHOLESTEROL 109 10/22/2020     (L) 10/23/2020    K 3.6 (L) 10/23/2020    CL 95 (L) 10/23/2020    CREATININE 0.60 (L) 10/23/2020    BUN 12 10/23/2020    CO2 22 10/23/2020    TSH 49.88 (H) 10/21/2020    LABA1C 5.5 10/22/2020        ASSESSMENT:  Patient Active Problem List    Diagnosis Date Noted    Idiopathic cardiomyopathy ([facility] 10/22/2020     Priority: High    [facility]est[facility] failure) [facility] 10/21/2020     Priority: Low    Acute on chronic combined systolic and diastolic CHF, NYHA class 3 ([facility] 10/21/2020     Priority: Low    Hypothyroid 10/21/2020     Priority: Low       PLAN:     Acute on chronic combined heart failure: New York Heart Association Class: IIb (Marked symptoms during daily activities)   Beta Blocker: Continue Carvedilol (Coreg) 12.5 mg twice daily.  ACE Inibitor/ARB: Continue losartan (Cozaar) 25 mg daily.  Diuretics: START furosemide (Lasix) 40 mg every morning.  Heart failure counseling: I advised them to try and keep their legs up whenever possible and to limit salt in their diet.  Planning to see him in the heart failure clinic in 1 to 2 weeks.  We will get a repeat basic metabolic panel, CBC and BMP on follow-up.  I will have our heart failure nurse call him next week to inquire about his weight, vital signs and medication compliance.    I personally reviewed his echo, his ejection fraction is at least 35%. I do not think he is a candidate for LifeVest or defibrillator therapy at this point. We will continue to evaluate.   Essential Hypertension: Controlled   Beta Blocker: Continue Carvedilol (Coreg) 12.5 mg twice daily. · ACE Inibitor/ARB: Continue losartan (Cozaar) 25 mg daily. · Diuretics: Continue furosemide (Lasix) 40 mg every morning. · Continue potassium supplement. · Calcium Channel Blocker: Not indicated at this time. · We may consider adding Aldactone and follow-up if blood pressure remains uncontrolled.  Suspected Obstructive Sleep Apnea: Given Mr. Samara Garland symptoms of daytime tiredness and not waking up rested in the morning, I advised her to consider undergoing a sleep apnea screening which she agreed to do. Therefore I ordered a Apnea link home screening monitor for her. Once again, thank you for allowing me to participate in this patients care. Please do not hesitate to contact me if I could be of any further assistance. Sincerely,  Corie Dakin MD, MS, F.A.C.C. [facility]) [facility], [address] [place] [address]  Phone: [phone], Fax: [phone]      I believe that the risk of significant morbidity and mortality related to the patient's current medical conditions are: intermediate-high. The documentation recorded by the scribe, accurately and completely reflects the services I personally performed and the decisions made by me. Corie Dakin MD, F.A.C.C.  October 23, 2020

## 2020-10-27 ENCOUNTER — TELEPHONE (OUTPATIENT)
Dept: CARDIOLOGY | Age: 60
End: 2020-10-27

## 2020-10-27 NOTE — TELEPHONE ENCOUNTER
Symptoms: Pt reports that he has been feeling about the same. He says that he has been sleeping a little bit better. SOB but has stayed the same. Denies CP, Palpitations, dizziness, lightheadedness. Weight: 259 pounds today  Weight upon discharge: 263 pounds  Does not have a vital sign machine to be able to take his BP/HR-I encouraged him to get one if able to      [address] Transitions Initial Follow Up Call: Attempted to call Mr. Nydia Mcdowell but no answer. M for him to call office back. Outreach made within 2 business days of discharge: Yes    Patient: Mary Hedrick Patient : 1960   MRN: [id number]  Reason for Admission: There are no discharge diagnoses documented for the most recent discharge. Discharge Date: 10/23/20       Spoke with: Radha Carbajal     Discharge department/facility: [facility] Interactive Patient Contact:  Was patient able to fill all prescriptions: Yes  Was patient instructed to bring all medications to the follow-up visit: No  Is patient taking all medications as directed in the discharge summary?  Yes  Does patient understand their discharge instructions: Yes  Does patient have questions or concerns that need addressed prior to 7-14 day follow up office visit: no    Scheduled appointment with PCP within 7-14 days    Follow Up  Future Appointments   Date Time Provider Jaspreet Paulino   2020  3:00 PM Talon Lujan MD TIFF CARD T[facility]         Randy Boucher RN

## 2020-11-03 ENCOUNTER — HOSPITAL ENCOUNTER (OUTPATIENT)
Age: 60
Discharge: HOME | End: 2020-11-03
Payer: COMMERCIAL

## 2020-11-03 LAB
ANION GAP SERPL CALCULATED.3IONS-SCNC: 11 MMOL/L (ref 9–17)
BNP INTERPRETATION: ABNORMAL
BUN BLDV-MCNC: 15 MG/DL (ref 8–23)
BUN/CREAT BLD: 23 (ref 9–20)
CALCIUM SERPL-MCNC: 9.5 MG/DL (ref 8.6–10.4)
CHLORIDE BLD-SCNC: 98 MMOL/L (ref 98–107)
CO2: 25 MMOL/L (ref 20–31)
CREAT SERPL-MCNC: 0.66 MG/DL (ref 0.7–1.2)
GFR AFRICAN AMERICAN: >60 ML/MIN
GFR NON-AFRICAN AMERICAN: >60 ML/MIN
GFR SERPL CREATININE-BSD FRML MDRD: ABNORMAL ML/MIN/{1.73_M2}
GFR SERPL CREATININE-BSD FRML MDRD: ABNORMAL ML/MIN/{1.73_M2}
GLUCOSE BLD-MCNC: 126 MG/DL (ref 70–99)
POTASSIUM SERPL-SCNC: 4.2 MMOL/L (ref 3.7–5.3)
PRO-BNP: 683 PG/ML
SODIUM BLD-SCNC: 134 MMOL/L (ref 135–144)

## 2020-11-03 PROCEDURE — 80048 BASIC METABOLIC PNL TOTAL CA: CPT

## 2020-11-03 PROCEDURE — 36415 COLL VENOUS BLD VENIPUNCTURE: CPT

## 2020-11-03 PROCEDURE — 83880 ASSAY OF NATRIURETIC PEPTIDE: CPT

## 2020-11-04 ENCOUNTER — OFFICE VISIT (OUTPATIENT)
Dept: CARDIOLOGY | Age: 60
End: 2020-11-04
Payer: COMMERCIAL

## 2020-11-04 ENCOUNTER — TELEPHONE (OUTPATIENT)
Dept: CARDIOLOGY | Age: 60
End: 2020-11-04

## 2020-11-04 VITALS
RESPIRATION RATE: 16 BRPM | OXYGEN SATURATION: 95 % | SYSTOLIC BLOOD PRESSURE: 142 MMHG | DIASTOLIC BLOOD PRESSURE: 96 MMHG | WEIGHT: 263 LBS | HEART RATE: 90 BPM | BODY MASS INDEX: 36.82 KG/M2 | HEIGHT: 71 IN

## 2020-11-04 PROCEDURE — 99495 TRANSJ CARE MGMT MOD F2F 14D: CPT | Performed by: INTERNAL MEDICINE

## 2020-11-04 RX ORDER — ATORVASTATIN CALCIUM 10 MG/1
10 TABLET, FILM COATED ORAL DAILY
Qty: 30 TABLET | Refills: 3 | Status: SHIPPED | OUTPATIENT
Start: 2020-11-04

## 2020-11-04 NOTE — PATIENT INSTRUCTIONS
SURVEY:    You may be receiving a survey from [email or web address] regarding your visit today. Please complete the survey to enable us to provide the highest quality of care to you and your family. If you cannot score us a very good on any question, please call the office to discuss how we could have made your experience a very good one. Thank you.

## 2020-11-04 NOTE — PROGRESS NOTES
I, Jaylene Lima RN am scribing for and in the presence of Alida Lujan MD.    Patient: Carlyn Santos  : 1960  Date of Visit: 2020    History of Present Illness:        Dear Jesse Guevara MD    REASON FOR VISIT / CONSULTATION:   Chief Complaint   Patient presents with    Follow-up     heart cath done on 10/21/20-medical management. Admission weuight: 260. discharge weight 263 and todays weight is 263 lbs. SOB but has stayed the same. Pt says that he could walk from the parking lot to office without havuing any issues. Denies CP, palpitations, dizziness, lightheadedness. I had the pleasure of seeing Carlyn Santos in my office today for hospital follow up. Mr. Sherrie Friedman is a 56 y.o. male with a history of a history of heart failure. He initially presented to the emergency room on 10/21/2020 because of shortness of breath on exertion, orthopnea and paroxysmal nocturnal dyspnea. Found to have tachycardia, abnormal ECG and acute on chronic combined CHF leading to his most recent cardiac work-up. He has a history of hypertension and dyslipidemia. He said he did not see Dr. Naveen Boo MD since . Denies any history of diabetes. He reported no significant family history of premature CAD. Cardiac catheterization on 10/21/2020 showed mild nonobstructive CAD with 50% stenosis of mid LAD otherwise just minor luminal irregularities. Nothing to explain his low ejection fraction. I reviewed his echo on 10/21/2020, ejection fraction is 35% with severe global hypokinesis and moderately increased left ventricular wall thickness. Picture is suggestive of hypertensive heart disease with heart failure. Patient started on carvedilol and losartan in addition to Lasix. He is here today for follow-up. He is doing well overall. His breathing is better. No further orthopnea or PND but continues to have shortness of breath on mild exertion.   He gives a classic history of obstructive sleep apnea syndrome and is scheduled to have a sleep study as an outpatient. He denies any dizziness or lightheadedness. No problems with his current medications. No fever or chills. No abdominal pain, nausea or vomiting. PAST MEDICAL HISTORY:        Past Medical History:   Diagnosis Date    Chronic combined systolic and diastolic CHF (congestive heart failure) ([facility] [address]     History of echocardiogram 10/21/2020    EF 30%    Hypertension     Hypothyroidism        CURRENT ALLERGIES: Patient has no known allergies. REVIEW OF SYSTEMS: 14 systems were reviewed. Pertinent positives and negatives as above, all else negative. Past Surgical History:   Procedure Laterality Date    CARDIAC CATHETERIZATION Left 10/22/2020    Right Radial/[facility]/    Social History:  Social History     Tobacco Use    Smoking status: Former Smoker     Last attempt to quit: 2011     Years since quittin.8    Smokeless tobacco: Never Used   Substance Use Topics    Alcohol use: Yes     Comment: 4 shots of whiskey daily    Drug use: Never        CURRENT MEDICATIONS:        Outpatient Medications Marked as Taking for the 20 encounter (Office Visit) with Vaibhav Quick MD   Medication Sig Dispense Refill    sacubitril-valsartan (ENTRESTO) 24-26 MG per tablet Take 1 tablet by mouth 2 times daily 56 tablet 0    carvedilol (COREG) 12.5 MG tablet Take 1 tablet by mouth 2 times daily (with meals) 60 tablet 3    furosemide (LASIX) 40 MG tablet Take 1 tablet by mouth daily 30 tablet 3    levothyroxine (SYNTHROID) 150 MCG tablet Take 1 tablet by mouth Daily 30 tablet 3    potassium chloride (KLOR-CON M) 20 MEQ extended release tablet Take 1 tablet by mouth daily 30 tablet 5       FAMILY HISTORY: No significant family history of premature CAD.     PHYSICAL EXAMINATION:     BP (!) 142/96 (Site: Right Upper Arm, Position: Sitting, Cuff Size: Large Adult)   Pulse 90   Resp 16   Ht 5' 11\" (1.803 m)   Wt 263 lb (119.3 kg)   SpO2 95%   BMI 36.68 kg/m²  Body mass index is 36.68 kg/m². Constitutional: He is oriented to person, place, and time. He appears well-developed and well-nourished. In no acute distress. HEENT: Normocephalic and atraumatic. No JVD present. Carotid bruit is not present. No mass and no thyromegaly present. No lymphadenopathy present. Cardiovascular: Normal rate, regular rhythm, normal heart sounds. Exam reveals no gallop and no friction rubs. 1/6 systolic murmur, 4th intercostal space on the LEFT must lateral to the sternal border. Pulmonary/Chest: Effort normal and breath sounds normal. No respiratory distress. He has no wheezes, rhonchi or rales. Abdominal: Soft, non-tender. Bowel sounds and aorta are normal. He exhibits no organomegaly, mass or bruit. Extremities: Trace-1+ at the ankles bilaterally. No cyanosis or clubbing. 2+ radial and carotid pulses. Distal extremity pulses: 2+ bilaterally. Neurological: He is alert and oriented to person, place, and time. No evidence of gross cranial nerve deficit. Coordination appeared normal.   Skin: Skin is warm and dry. There is no rash or diaphoresis. Psychiatric: He has a normal mood and affect.  His speech is normal and behavior is normal.      MOST RECENT LABS ON RECORD:   Lab Results   Component Value Date    WBC 8.2 10/22/2020    HGB 14.6 10/22/2020    HCT 43.6 10/22/2020     (L) 10/22/2020    CHOL 188 10/22/2020    TRIG 123 10/22/2020    HDL 54 10/22/2020     (L) 11/03/2020    K 4.2 11/03/2020    CL 98 11/03/2020    CREATININE 0.66 (L) 11/03/2020    BUN 15 11/03/2020    CO2 25 11/03/2020    TSH 49.88 (H) 10/21/2020    LABA1C 5.5 10/22/2020       ASSESSMENT:     Patient Active Problem List    Diagnosis Date Noted    Idiopathic cardiomyopathy ([facility] [address] 10/22/2020     Priority: High    CHF (congestive heart failure) ([facility] [address] 10/21/2020    Acute on chronic combined systolic and diastolic CHF, NYHA class 3 ([address] 10/21/2020    Hypothyroid 10/21/2020      Diagnosis Orders   1. Chronic combined systolic and diastolic congestive heart failure (HCC)  Basic Metabolic Panel    Basic Metabolic Panel    sacubitril-valsartan (ENTRESTO) 24-26 MG per tablet   2. LORRI (obstructive sleep apnea)     3. Nonischemic cardiomyopathy ([facility] [address]     4. Hypertensive heart disease with heart failure (HCC)     5. Obesity, Class II, BMI 35-39.9         PLAN:         Acute on chronic combined heart failure: New York Heart Association Class: IIb (Marked symptoms during daily activities)   Beta Blocker: Continue Carvedilol (Coreg) 12.5 mg twice daily.  ACE Inibitor/ARB: STOP losartan (Cozaar) and START sacubitril/valsartan (Entresto) 24/26 mg twice daily. I also discussed the potential side effects of this medication including lightheadedness and dizziness and instructed them to stop the medication of this occurs and call our office if this occurs. However, because of his persistent New York Heart Association class II-III symptoms of shortness of breath with mild to moderate exertion in spite of arguably maximal tolerated medical management, combined with her reduced ejection fraction <40%, I would like to consider starting on a newer agent called Entresto (sacubitril/valsartan). This has been shown to improve heart failure symptoms as well as reduce hospitalization in patients with class II through class IV heart failure who also have an EF <40%. I discussed the risks and benefits of the drug including the risk of lightheadedness and dizziness as well as rare but serious potential angioedema and he was very interested in trying this medication. Therefore, I took the liberty of starting him on the starting dose of 24mg/26mg. I also stressed that it was very important that any ACE or ARB be discontinued for at least 36 hours prior to starting Entresto.  I also provided him with 2 weeks of samples of both the 24mg/26mg doses and instructed him it its advised them to call our office or go to the emergency room if they developed worsening or persistent chest pain or increased shortness of breath as this could be life threatening.  And his ejection fraction by echo is 35%, I do not think LifeVest is indicated at this point. We will get a repeat echo on follow-up and if ejection fraction remains low we will consider defibrillator therapy. Finally, I recommended that he continue his other medications and follow up with you as previously scheduled. FOLLOW UP:   I told Mr. Jenny Roman to call my office if he had any problems, but otherwise I asked him to Return in about 6 weeks (around 12/16/2020). However, I would be happy to see him sooner should the need arise. Sincerely,  Arielle Borden MD, F.A.C.C. [facility] Cardiology Specialist    [address] [place] [address]  Phone: [phone], Fax: [phone]     I believe that the risk of significant morbidity and mortality related to the patient's current medical conditions are: high. The documentation recorded by the scribe, accurately and completely reflects the services I personally performed and the decisions made by me. Arielle Borden MD, F.A.C.C.  November 4, 2020

## 2020-11-10 ENCOUNTER — HOSPITAL ENCOUNTER (OUTPATIENT)
Dept: SLEEP CENTER | Age: 60
Discharge: HOME | End: 2020-11-10
Payer: COMMERCIAL

## 2020-11-10 PROCEDURE — 95806 SLEEP STUDY UNATT&RESP EFFT: CPT

## 2020-11-12 LAB — STATUS: NORMAL

## 2020-11-13 ENCOUNTER — TELEPHONE (OUTPATIENT)
Dept: CARDIOLOGY | Age: 60
End: 2020-11-13

## 2020-11-13 NOTE — TELEPHONE ENCOUNTER
----- Message from Dionne Ornelas MD sent at 11/12/2020  8:18 PM EST -----  Home sleep study showing severe sleep apnea. I placed an order for CPAP titration study. Please call with questions and/or concerns.   Thank you

## 2020-11-18 ENCOUNTER — HOSPITAL ENCOUNTER (OUTPATIENT)
Age: 60
Discharge: HOME | End: 2020-11-18
Payer: COMMERCIAL

## 2020-11-18 LAB
ABSOLUTE EOS #: 0.38 K/UL (ref 0–0.44)
ABSOLUTE IMMATURE GRANULOCYTE: 0.03 K/UL (ref 0–0.3)
ABSOLUTE LYMPH #: 1.66 K/UL (ref 1.1–3.7)
ABSOLUTE MONO #: 0.89 K/UL (ref 0.1–1.2)
ALBUMIN SERPL-MCNC: 3.6 G/DL (ref 3.5–5.2)
ALBUMIN/GLOBULIN RATIO: 1.1 (ref 1–2.5)
ALP BLD-CCNC: 92 U/L (ref 40–129)
ALT SERPL-CCNC: 31 U/L (ref 5–41)
ANION GAP SERPL CALCULATED.3IONS-SCNC: 11 MMOL/L (ref 9–17)
AST SERPL-CCNC: 58 U/L
BASOPHILS # BLD: 1 % (ref 0–2)
BASOPHILS ABSOLUTE: 0.08 K/UL (ref 0–0.2)
BILIRUB SERPL-MCNC: 0.82 MG/DL (ref 0.3–1.2)
BUN BLDV-MCNC: 8 MG/DL (ref 8–23)
BUN/CREAT BLD: 13 (ref 9–20)
CALCIUM SERPL-MCNC: 9 MG/DL (ref 8.6–10.4)
CHLORIDE BLD-SCNC: 100 MMOL/L (ref 98–107)
CHOLESTEROL/HDL RATIO: 2.6
CHOLESTEROL: 91 MG/DL
CO2: 22 MMOL/L (ref 20–31)
CREAT SERPL-MCNC: 0.64 MG/DL (ref 0.7–1.2)
DIFFERENTIAL TYPE: ABNORMAL
EOSINOPHILS RELATIVE PERCENT: 4 % (ref 1–4)
GFR AFRICAN AMERICAN: >60 ML/MIN
GFR NON-AFRICAN AMERICAN: >60 ML/MIN
GFR SERPL CREATININE-BSD FRML MDRD: ABNORMAL ML/MIN/{1.73_M2}
GFR SERPL CREATININE-BSD FRML MDRD: ABNORMAL ML/MIN/{1.73_M2}
GLUCOSE BLD-MCNC: 108 MG/DL (ref 70–99)
HCT VFR BLD CALC: 44.5 % (ref 40.7–50.3)
HDLC SERPL-MCNC: 35 MG/DL
HEMOGLOBIN: 14.5 G/DL (ref 13–17)
IMMATURE GRANULOCYTES: 0 %
LDL CHOLESTEROL: 42 MG/DL (ref 0–130)
LYMPHOCYTES # BLD: 18 % (ref 24–43)
MCH RBC QN AUTO: 35.6 PG (ref 25.2–33.5)
MCHC RBC AUTO-ENTMCNC: 32.6 G/DL (ref 28.4–34.8)
MCV RBC AUTO: 109.3 FL (ref 82.6–102.9)
MONOCYTES # BLD: 10 % (ref 3–12)
NRBC AUTOMATED: 0 PER 100 WBC
PDW BLD-RTO: 11.8 % (ref 11.8–14.4)
PLATELET # BLD: 153 K/UL (ref 138–453)
PLATELET ESTIMATE: ABNORMAL
PMV BLD AUTO: 11.3 FL (ref 8.1–13.5)
POTASSIUM SERPL-SCNC: 4 MMOL/L (ref 3.7–5.3)
PROSTATE SPECIFIC ANTIGEN: 0.75 UG/L
RBC # BLD: 4.07 M/UL (ref 4.21–5.77)
RBC # BLD: ABNORMAL 10*6/UL
SEG NEUTROPHILS: 67 % (ref 36–65)
SEGMENTED NEUTROPHILS ABSOLUTE COUNT: 6 K/UL (ref 1.5–8.1)
SODIUM BLD-SCNC: 133 MMOL/L (ref 135–144)
THYROXINE, FREE: 1.17 NG/DL (ref 0.93–1.7)
TOTAL PROTEIN: 6.9 G/DL (ref 6.4–8.3)
TRIGL SERPL-MCNC: 70 MG/DL
TSH SERPL DL<=0.05 MIU/L-ACNC: 5.93 MIU/L (ref 0.3–5)
VITAMIN D 25-HYDROXY: 58.2 NG/ML (ref 30–100)
VLDLC SERPL CALC-MCNC: ABNORMAL MG/DL (ref 1–30)
WBC # BLD: 9 K/UL (ref 3.5–11.3)
WBC # BLD: ABNORMAL 10*3/UL

## 2020-11-18 PROCEDURE — 80061 LIPID PANEL: CPT

## 2020-11-18 PROCEDURE — 84443 ASSAY THYROID STIM HORMONE: CPT

## 2020-11-18 PROCEDURE — 85025 COMPLETE CBC W/AUTO DIFF WBC: CPT

## 2020-11-18 PROCEDURE — 83036 HEMOGLOBIN GLYCOSYLATED A1C: CPT

## 2020-11-18 PROCEDURE — 84153 ASSAY OF PSA TOTAL: CPT

## 2020-11-18 PROCEDURE — 84439 ASSAY OF FREE THYROXINE: CPT

## 2020-11-18 PROCEDURE — 36415 COLL VENOUS BLD VENIPUNCTURE: CPT

## 2020-11-18 PROCEDURE — 82306 VITAMIN D 25 HYDROXY: CPT

## 2020-11-18 PROCEDURE — 80053 COMPREHEN METABOLIC PANEL: CPT

## 2020-11-19 LAB
ESTIMATED AVERAGE GLUCOSE: 105 MG/DL
HBA1C MFR BLD: 5.3 % (ref 4–6)

## 2020-12-16 ENCOUNTER — OFFICE VISIT (OUTPATIENT)
Dept: CARDIOLOGY | Age: 60
End: 2020-12-16
Payer: COMMERCIAL

## 2020-12-16 ENCOUNTER — HOSPITAL ENCOUNTER (OUTPATIENT)
Age: 60
Discharge: HOME | End: 2020-12-16
Payer: COMMERCIAL

## 2020-12-16 VITALS
HEART RATE: 77 BPM | SYSTOLIC BLOOD PRESSURE: 116 MMHG | RESPIRATION RATE: 18 BRPM | HEIGHT: 71 IN | WEIGHT: 257.4 LBS | OXYGEN SATURATION: 95 % | DIASTOLIC BLOOD PRESSURE: 72 MMHG | BODY MASS INDEX: 36.03 KG/M2

## 2020-12-16 LAB
ANION GAP SERPL CALCULATED.3IONS-SCNC: 10 MMOL/L (ref 9–17)
BNP INTERPRETATION: ABNORMAL
BUN BLDV-MCNC: 13 MG/DL (ref 8–23)
BUN/CREAT BLD: 22 (ref 9–20)
CALCIUM SERPL-MCNC: 9.2 MG/DL (ref 8.6–10.4)
CHLORIDE BLD-SCNC: 99 MMOL/L (ref 98–107)
CO2: 26 MMOL/L (ref 20–31)
CREAT SERPL-MCNC: 0.58 MG/DL (ref 0.7–1.2)
GFR AFRICAN AMERICAN: >60 ML/MIN
GFR NON-AFRICAN AMERICAN: >60 ML/MIN
GFR SERPL CREATININE-BSD FRML MDRD: ABNORMAL ML/MIN/{1.73_M2}
GFR SERPL CREATININE-BSD FRML MDRD: ABNORMAL ML/MIN/{1.73_M2}
GLUCOSE BLD-MCNC: 98 MG/DL (ref 70–99)
HCT VFR BLD CALC: 43 % (ref 40.7–50.3)
HEMOGLOBIN: 14.1 G/DL (ref 13–17)
MCH RBC QN AUTO: 34.6 PG (ref 25.2–33.5)
MCHC RBC AUTO-ENTMCNC: 32.8 G/DL (ref 28.4–34.8)
MCV RBC AUTO: 105.7 FL (ref 82.6–102.9)
NRBC AUTOMATED: 0 PER 100 WBC
PDW BLD-RTO: 10.9 % (ref 11.8–14.4)
PLATELET # BLD: 152 K/UL (ref 138–453)
PMV BLD AUTO: 11.2 FL (ref 8.1–13.5)
POTASSIUM SERPL-SCNC: 4.5 MMOL/L (ref 3.7–5.3)
PRO-BNP: 315 PG/ML
RBC # BLD: 4.07 M/UL (ref 4.21–5.77)
SODIUM BLD-SCNC: 135 MMOL/L (ref 135–144)
WBC # BLD: 8.8 K/UL (ref 3.5–11.3)

## 2020-12-16 PROCEDURE — 99214 OFFICE O/P EST MOD 30 MIN: CPT | Performed by: INTERNAL MEDICINE

## 2020-12-16 PROCEDURE — 80048 BASIC METABOLIC PNL TOTAL CA: CPT

## 2020-12-16 PROCEDURE — 36415 COLL VENOUS BLD VENIPUNCTURE: CPT

## 2020-12-16 PROCEDURE — 85027 COMPLETE CBC AUTOMATED: CPT

## 2020-12-16 PROCEDURE — 93000 ELECTROCARDIOGRAM COMPLETE: CPT | Performed by: INTERNAL MEDICINE

## 2020-12-16 PROCEDURE — 83880 ASSAY OF NATRIURETIC PEPTIDE: CPT

## 2020-12-16 NOTE — PATIENT INSTRUCTIONS
SURVEY:    You may be receiving a survey from [employer] regarding your visit today. Please complete the survey to enable us to provide the highest quality of care to you and your family. If you cannot score us a very good on any question, please call the office to discuss how we could have made your experience a very good one. Thank you.     Your MA today was Iron Barton

## 2020-12-16 NOTE — PROGRESS NOTES
or tightness. No fever or cough. No palpitations, dizziness or lightheadedness. No abdominal pain, nausea or vomiting. No other concern at this point. He is tolerating current cardiac medications with no significant side effects. His blood pressure is very good today. EKG done today in office 2020- Normal sinus rhythm 77 bpm    PAST MEDICAL HISTORY:        Past Medical History:   Diagnosis Date    Chronic combined systolic and diastolic CHF (congestive heart failure) ([address]     History of echocardiogram 10/21/2020    EF 30%    Hypertension     Hypothyroidism    Obstructive sleep apnea syndrome, severe    CURRENT ALLERGIES: Patient has no known allergies. REVIEW OF SYSTEMS: 14 systems were reviewed. Pertinent positives and negatives as above, all else negative.      Past Surgical History:   Procedure Laterality Date    [facility]2020    [facility]    Social History:  Social History     Tobacco Use    Smoking status: Former Smoker     Packs/day: 1.00     Years: 30.00     Pack years: 30.00     Quit date: 2011     Years since quittin.9    Smokeless tobacco: Never Used   Substance Use Topics    Alcohol use: Yes     Comment: 4 shots of whiskey daily    Drug use: Never        CURRENT MEDICATIONS:        Outpatient Medications Marked as Taking for the 20 encounter (Office Visit) with Tremaine Sow MD   Medication Sig Dispense Refill    sacubitril-valsartan (ENTRESTO) 24-26 MG per tablet Take 1 tablet by mouth 2 times daily 56 tablet 0    atorvastatin (LIPITOR) 10 MG tablet Take 1 tablet by mouth daily 30 tablet 3    carvedilol (COREG) 12.5 MG tablet Take 1 tablet by mouth 2 times daily (with meals) 60 tablet 3    furosemide (LASIX) 40 MG tablet Take 1 tablet by mouth daily 30 tablet 3    levothyroxine (SYNTHROID) 150 MCG tablet Take 1 tablet by mouth Daily 30 tablet 3    potassium chloride (KLOR-CON M) 20 MEQ extended release tablet Take 1 tablet by mouth daily 30 tablet 5       FAMILY HISTORY: No significant family history of premature CAD. PHYSICAL EXAMINATION:     /72 (Site: Left Upper Arm, Position: Sitting, Cuff Size: Medium Adult)   Pulse 77   Resp 18   Ht 5' 11\" (1.803 m)   Wt 257 lb 6.4 oz (116.8 kg)   SpO2 95%   BMI 35.90 kg/m²  Body mass index is 35.9 kg/m². Constitutional: He is oriented to person, place, and time. He appears well-developed and well-nourished. In no acute distress. HEENT: Normocephalic and atraumatic. No JVD present. Carotid bruit is not present. No mass and no thyromegaly present. No lymphadenopathy present. Cardiovascular: Normal rate, regular rhythm, normal heart sounds. Exam reveals no gallop and no friction rubs. 1/6 systolic murmur, 4th intercostal space on the LEFT must lateral to the sternal border. Pulmonary/Chest: Effort normal and breath sounds normal. No respiratory distress. He has no wheezes, rhonchi or rales. Abdominal: Soft, non-tender. Bowel sounds and aorta are normal. He exhibits no organomegaly, mass or bruit. Extremities: Trace-1+ at the ankles bilaterally. No cyanosis or clubbing. 2+ radial and carotid pulses. Distal extremity pulses: 2+ bilaterally. Neurological: He is alert and oriented to person, place, and time. No evidence of gross cranial nerve deficit. Coordination appeared normal.   Skin: Skin is warm and dry. There is no rash or diaphoresis. Psychiatric: He has a normal mood and affect.  His speech is normal and behavior is normal.      MOST RECENT LABS ON RECORD:   Lab Results   Component Value Date    WBC 9.0 11/18/2020    HGB 14.5 11/18/2020    HCT 44.5 11/18/2020     11/18/2020    CHOL 91 11/18/2020    TRIG 70 11/18/2020    HDL 35 (L) 11/18/2020    ALT 31 11/18/2020    AST 58 (H) 11/18/2020     (L) 11/18/2020    K 4.0 11/18/2020     11/18/2020    CREATININE 0.64 (L) 11/18/2020    BUN 8 11/18/2020    CO2 22 11/18/2020    TSH 5.93 (H) 11/18/2020    PSA 0.75 11/18/2020    LABA1C 5.3 11/18/2020       ASSESSMENT:     Patient Active Problem List    Diagnosis Date Noted    Idiopathic cardiomyopathy ([facility] 10/22/2020     Priority: High    CHF (congestive heart failure) ([facility] 10/21/2020    Acute on chronic combined systolic and diastolic CHF, NYHA class 3 ([facility] 10/21/2020    Hypothyroid 10/21/2020      Diagnosis Orders   1. NICM (nonischemic cardiomyopathy) (HCC)  EKG 12 lead    NM CARDIAC MUGA SCAN    CBC    Basic Metabolic Panel    Brain Natriuretic Peptide   2. Chronic combined systolic and diastolic congestive heart failure ([facility]     3. Essential hypertension     4. LORRI (obstructive sleep apnea)     5. Obesity, Class II, BMI 35-39.9         PLAN:         Acute on chronic combined heart failure: NYHA class II    beta Blocker: Continue Carvedilol (Coreg) 12.5 mg twice daily.  ACE Inibitor/ARB: Continue sacubitril/valsartan (Entresto) 24/26 mg twice daily.  Diuretics: Continue furosemide (Lasix) 40 mg every morning.  Heart failure counseling: I told them to continue wearing lower extremity compression stockings and I advised them to try and keep their legs up whenever possible and to limit salt in their diet.  I will get a repeat basic metabolic panel, BNP and CBC today.  I will also get a MUGA scan for better assessment of his left ventricular systolic function. He has class II heart failure with ejection fraction of 35%. If his ejection fraction is lower then we will consider ICD therapy for primary prevention of sudden cardiac death. · Essential Hypertension: Controlled   Beta Blocker: Continue Carvedilol (Coreg) 12.5 mg twice daily.  ACE Inibitor/ARB: Continue sacubitril/valsartan (Entresto) 24/26 mg twice daily.  Diuretics: Continue furosemide (Lasix) 40 mg every morning.     Additional Testing List: I took the liberty of ordering a BMP for today to assess their potassium and renal function, I took the liberty of ordering a CBC and I ordered a pro BNP level to better assess for the patients level of heart failure     Severe obstructive Sleep Apnea: Awaiting CPAP titration study. · Mild nonobstructive CAD   Antiplatelet Agent: Continue Aspirin 81 mg daily.  Beta Blocker: Continue Carvedilol (Coreg) 12.5 mg twice daily.  Anti-anginal medications: Not indicated at this time.  Cholesterol Reduction Therapy: Continue Atorvastatin (Lipitor) 10 mg daily      Additional counseling: I advised them to call our office or go to the emergency room if they developed worsening or persistent chest pain or increased shortness of breath as this could be life threatening. Obesity: Body mass index is 35.9 kg/m². I also briefly discussed both diet and exercise strategies for him to continue to loses weight and he was very receptive to this. Finally, I recommended that he continue his other medications and follow up with you as previously scheduled. FOLLOW UP:   I told Mr. Angel Nelson to call my office if he had any problems, but otherwise I asked him to Return in about 6 months (around 6/16/2021). However, I would be happy to see him sooner should the need arise. Sincerely,  Jerry Bradley MD, F.A.C.C. [facility] Cardiology Specialist     [address]  [address] [facility]  Phone: [phone], Fax: [phone]     I believe that the risk of significant morbidity and mortality related to the patient's current medical conditions are: intermediate-high. 25 minutes were spent with the patient and all of their questions were answered. The documentation recorded by the scribe, accurately and completely reflects the services I personally performed and the decisions made by me. Jerry Bradley MD, F.A.C.C.  December 16, 2020

## 2020-12-17 ENCOUNTER — TELEPHONE (OUTPATIENT)
Dept: PRIMARY CARE CLINIC | Age: 60
End: 2020-12-17

## 2020-12-17 NOTE — TELEPHONE ENCOUNTER
----- Message from Tremaine Sow MD sent at 12/16/2020  8:39 PM EST -----  Blood work is good. Continue current therapy and follow-up. Please call with questions and/or concerns.   Thank you

## 2020-12-28 ENCOUNTER — HOSPITAL ENCOUNTER (OUTPATIENT)
Dept: NUCLEAR MEDICINE | Age: 60
Discharge: HOME | End: 2020-12-30
Payer: COMMERCIAL

## 2020-12-28 PROCEDURE — A9560 TC99M LABELED RBC: HCPCS | Performed by: INTERNAL MEDICINE

## 2020-12-28 PROCEDURE — 78472 GATED HEART PLANAR SINGLE: CPT

## 2020-12-28 PROCEDURE — 3430000000 HC RX DIAGNOSTIC RADIOPHARMACEUTICAL: Performed by: INTERNAL MEDICINE

## 2020-12-28 RX ADMIN — Medication 25 MILLICURIE: at 09:40

## 2020-12-28 NOTE — PROCEDURES
[address] [facility] [address]                                      MU[place]    PATIENT NAME: Nir Myrick                   :        1960  MED REC NO:   [id number]                              ROOM:  ACCOUNT NO:   [de-identified]                           ADMIT DATE: 2020  PROVIDER:     aKtarina Wagner 137 OF SERVICE:  2020    ORDERING PROVIDER:  Lennox Lynch MD    PRIMARY CARE PROVIDER:  Jayde Jones MD    INTERPRETING PHYSICIAN:  Lennox Lynch MD    MUGDANNA BLOOD POOL STUDY    CLINICAL INDICATION:  Nonischemic cardiomyopathy. INDICATION:  Assessment of left ventricular systolic function and wall  motion. RADIOACTIVE MATERIAL DOSE:  24.9 mCi labelled red blood cells. TECHNIQUE:  Following IV administration of autologous red cells labeled  in vitro with 99m-Tc pertechnetate, gated equilibrium images were  acquired in the left anterior oblique (AYAH), anterior (ANT), and left  lateral (LAT) projections. Computer estimated left ventricular ejection  fraction (LVEF) was calculated from the total counts in the end-systolic  and end-diastolic left ventricular regions with background correction. FINDINGS:  Gated imaging demonstrates global.  The calculated left  ventricular ejection fraction was 44 %.       Avril Wilder    D: 2020 14:23:20       T: 2020 14:28:12     [email or web address]  Job#: [id number]     Doc#: Unknown    CC:  Jayde Jones

## 2020-12-29 ENCOUNTER — TELEPHONE (OUTPATIENT)
Dept: CARDIOLOGY | Age: 60
End: 2020-12-29

## 2021-01-11 ENCOUNTER — TELEPHONE (OUTPATIENT)
Dept: CARDIOLOGY | Age: 61
End: 2021-01-11

## 2021-01-11 RX ORDER — SACUBITRIL AND VALSARTAN 24; 26 MG/1; MG/1
1 TABLET, FILM COATED ORAL 2 TIMES DAILY
Qty: 28 TABLET | Refills: 0 | COMMUNITY
Start: 2021-01-11 | End: 2021-01-25

## 2021-02-26 DIAGNOSIS — E03.9 HYPOTHYROIDISM, UNSPECIFIED TYPE: Primary | ICD-10-CM

## 2021-02-26 DIAGNOSIS — I42.9 IDIOPATHIC CARDIOMYOPATHY (HCC): ICD-10-CM

## 2021-02-26 RX ORDER — CARVEDILOL 12.5 MG/1
12.5 TABLET ORAL 2 TIMES DAILY WITH MEALS
Qty: 180 TABLET | Refills: 3 | Status: SHIPPED | OUTPATIENT
Start: 2021-02-26 | End: 2021-12-15

## 2021-02-26 RX ORDER — FUROSEMIDE 40 MG/1
40 TABLET ORAL DAILY
Qty: 90 TABLET | Refills: 3 | Status: SHIPPED | OUTPATIENT
Start: 2021-02-26 | End: 2022-03-14 | Stop reason: SDUPTHER

## 2021-02-26 RX ORDER — LEVOTHYROXINE SODIUM 0.15 MG/1
150 TABLET ORAL DAILY
Qty: 90 TABLET | Refills: 3 | Status: SHIPPED | OUTPATIENT
Start: 2021-02-26 | End: 2022-03-14 | Stop reason: SDUPTHER

## 2021-03-08 ENCOUNTER — HOSPITAL ENCOUNTER (OUTPATIENT)
Age: 61
Discharge: HOME | End: 2021-03-08
Payer: COMMERCIAL

## 2021-03-08 LAB
ABSOLUTE EOS #: <0.03 K/UL (ref 0–0.44)
ABSOLUTE IMMATURE GRANULOCYTE: <0.03 K/UL (ref 0–0.3)
ABSOLUTE LYMPH #: 1.83 K/UL (ref 1.1–3.7)
ABSOLUTE MONO #: 0.91 K/UL (ref 0.1–1.2)
ALBUMIN SERPL-MCNC: 3.6 G/DL (ref 3.5–5.2)
ALBUMIN/GLOBULIN RATIO: 1.1 (ref 1–2.5)
ALP BLD-CCNC: 89 U/L (ref 40–129)
ALT SERPL-CCNC: 21 U/L (ref 5–41)
ANION GAP SERPL CALCULATED.3IONS-SCNC: 9 MMOL/L (ref 9–17)
AST SERPL-CCNC: 27 U/L
BASOPHILS # BLD: 1 % (ref 0–2)
BASOPHILS ABSOLUTE: 0.05 K/UL (ref 0–0.2)
BILIRUB SERPL-MCNC: 0.76 MG/DL (ref 0.3–1.2)
BUN BLDV-MCNC: 11 MG/DL (ref 8–23)
BUN/CREAT BLD: 19 (ref 9–20)
CALCIUM SERPL-MCNC: 9 MG/DL (ref 8.6–10.4)
CHLORIDE BLD-SCNC: 102 MMOL/L (ref 98–107)
CHOLESTEROL/HDL RATIO: 2.7
CHOLESTEROL: 116 MG/DL
CO2: 26 MMOL/L (ref 20–31)
CREAT SERPL-MCNC: 0.59 MG/DL (ref 0.7–1.2)
DIFFERENTIAL TYPE: ABNORMAL
EOSINOPHILS RELATIVE PERCENT: 0 % (ref 1–4)
ESTIMATED AVERAGE GLUCOSE: 108 MG/DL
GFR AFRICAN AMERICAN: >60 ML/MIN
GFR NON-AFRICAN AMERICAN: >60 ML/MIN
GFR SERPL CREATININE-BSD FRML MDRD: ABNORMAL ML/MIN/{1.73_M2}
GFR SERPL CREATININE-BSD FRML MDRD: ABNORMAL ML/MIN/{1.73_M2}
GLUCOSE BLD-MCNC: 106 MG/DL (ref 70–99)
HBA1C MFR BLD: 5.4 % (ref 4–6)
HCT VFR BLD CALC: 43.8 % (ref 40.7–50.3)
HDLC SERPL-MCNC: 43 MG/DL
HEMOGLOBIN: 14.4 G/DL (ref 13–17)
IMMATURE GRANULOCYTES: 0 %
LDL CHOLESTEROL: 54 MG/DL (ref 0–130)
LYMPHOCYTES # BLD: 23 % (ref 24–43)
MCH RBC QN AUTO: 33.1 PG (ref 25.2–33.5)
MCHC RBC AUTO-ENTMCNC: 32.9 G/DL (ref 28.4–34.8)
MCV RBC AUTO: 100.7 FL (ref 82.6–102.9)
MONOCYTES # BLD: 11 % (ref 3–12)
NRBC AUTOMATED: 0 PER 100 WBC
PDW BLD-RTO: 11.5 % (ref 11.8–14.4)
PLATELET # BLD: ABNORMAL K/UL (ref 138–453)
PLATELET ESTIMATE: ABNORMAL
PLATELET, FLUORESCENCE: 150 K/UL (ref 138–453)
PLATELET, IMMATURE FRACTION: 10 % (ref 1.1–10.3)
PMV BLD AUTO: ABNORMAL FL (ref 8.1–13.5)
POTASSIUM SERPL-SCNC: 4.4 MMOL/L (ref 3.7–5.3)
RBC # BLD: 4.35 M/UL (ref 4.21–5.77)
RBC # BLD: ABNORMAL 10*6/UL
SEG NEUTROPHILS: 65 % (ref 36–65)
SEGMENTED NEUTROPHILS ABSOLUTE COUNT: 5.29 K/UL (ref 1.5–8.1)
SODIUM BLD-SCNC: 137 MMOL/L (ref 135–144)
THYROXINE, FREE: 1.18 NG/DL (ref 0.93–1.7)
TOTAL PROTEIN: 6.8 G/DL (ref 6.4–8.3)
TRIGL SERPL-MCNC: 94 MG/DL
TSH SERPL DL<=0.05 MIU/L-ACNC: 0.62 MIU/L (ref 0.3–5)
VLDLC SERPL CALC-MCNC: NORMAL MG/DL (ref 1–30)
WBC # BLD: 8.1 K/UL (ref 3.5–11.3)
WBC # BLD: ABNORMAL 10*3/UL

## 2021-03-08 PROCEDURE — 80061 LIPID PANEL: CPT

## 2021-03-08 PROCEDURE — 85055 RETICULATED PLATELET ASSAY: CPT

## 2021-03-08 PROCEDURE — 83036 HEMOGLOBIN GLYCOSYLATED A1C: CPT

## 2021-03-08 PROCEDURE — 36415 COLL VENOUS BLD VENIPUNCTURE: CPT

## 2021-03-08 PROCEDURE — 84439 ASSAY OF FREE THYROXINE: CPT

## 2021-03-08 PROCEDURE — 84443 ASSAY THYROID STIM HORMONE: CPT

## 2021-03-08 PROCEDURE — 85025 COMPLETE CBC W/AUTO DIFF WBC: CPT

## 2021-03-08 PROCEDURE — 80053 COMPREHEN METABOLIC PANEL: CPT

## 2021-06-10 ENCOUNTER — TELEPHONE (OUTPATIENT)
Dept: CARDIOLOGY | Age: 61
End: 2021-06-10

## 2021-06-10 RX ORDER — SACUBITRIL AND VALSARTAN 24; 26 MG/1; MG/1
1 TABLET, FILM COATED ORAL 2 TIMES DAILY
Qty: 28 TABLET | Refills: 0 | COMMUNITY
Start: 2021-06-10 | End: 2021-06-16 | Stop reason: SINTOL

## 2021-06-16 ENCOUNTER — OFFICE VISIT (OUTPATIENT)
Dept: CARDIOLOGY | Age: 61
End: 2021-06-16
Payer: COMMERCIAL

## 2021-06-16 VITALS
OXYGEN SATURATION: 97 % | RESPIRATION RATE: 16 BRPM | HEIGHT: 71 IN | BODY MASS INDEX: 35.56 KG/M2 | WEIGHT: 254 LBS | DIASTOLIC BLOOD PRESSURE: 77 MMHG | HEART RATE: 60 BPM | SYSTOLIC BLOOD PRESSURE: 152 MMHG

## 2021-06-16 DIAGNOSIS — N62 GYNECOMASTIA: ICD-10-CM

## 2021-06-16 DIAGNOSIS — G47.33 SEVERE OBSTRUCTIVE SLEEP APNEA: ICD-10-CM

## 2021-06-16 DIAGNOSIS — E66.9 OBESITY, CLASS II, BMI 35-39.9: ICD-10-CM

## 2021-06-16 DIAGNOSIS — I50.42 CHRONIC COMBINED SYSTOLIC (CONGESTIVE) AND DIASTOLIC (CONGESTIVE) HEART FAILURE (HCC): Primary | ICD-10-CM

## 2021-06-16 DIAGNOSIS — I10 ESSENTIAL HYPERTENSION: ICD-10-CM

## 2021-06-16 DIAGNOSIS — I25.10 MILD CAD: ICD-10-CM

## 2021-06-16 PROCEDURE — 99214 OFFICE O/P EST MOD 30 MIN: CPT | Performed by: INTERNAL MEDICINE

## 2021-06-16 RX ORDER — LOSARTAN POTASSIUM 100 MG/1
100 TABLET ORAL DAILY
Qty: 90 TABLET | Refills: 3 | Status: SHIPPED | OUTPATIENT
Start: 2021-06-16 | End: 2022-06-13 | Stop reason: SDUPTHER

## 2021-06-16 NOTE — PROGRESS NOTES
Quin Alexandra RN am scribing for and in the presence of Sandi Garcia MD, F.A.C.C..    Patient: Shabnam Spring  : 1960  Date of Visit: 2021    History of Present Illness:        Dear Gómez Peters MD    REASON FOR VISIT / CONSULTATION:   Chief Complaint   Patient presents with    Follow-up     Hx: NICM, CHF, HTN, LORRI, obese. denies cp, lightheadedness/dizziness, palpitations. sobn-down 3 poudns since last visit. Dear Dr Stewart Fall,    I had the pleasure of seeing Shabnam Spring in my office today for hospital follow up. Mr. Maribel Cano is a 59 y.o. male with a history of a history of heart failure. He initially presented to the emergency room on 10/21/2020 because of shortness of breath on exertion, orthopnea and paroxysmal nocturnal dyspnea. Found to have tachycardia, abnormal ECG and acute on chronic combined CHF leading to his most recent cardiac work-up. He has a history of hypertension and dyslipidemia. He said he did not see Dr. Reed Infante MD since . Denies any history of diabetes. He reported no significant family history of premature CAD. Cardiac catheterization on 10/21/2020 showed mild nonobstructive CAD with 50% stenosis of mid LAD otherwise just minor luminal irregularities. Nothing to explain his low ejection fraction. I reviewed his echo on 10/21/2020, ejection fraction is 35% with severe global hypokinesis and moderately increased left ventricular wall thickness. Picture is suggestive of hypertensive heart disease with heart failure. EKG done office 2020- Normal sinus rhythm 77 bpm    MUGA scan showed 44% on 20     Down 3 pounds since last visit and is doing well. No signs of fluid overload or worsening symptoms.    Wt Readings from Last 3 Encounters:   21 254 lb (115.2 kg)   20 257 lb 6.4 oz (116.8 kg)   20 263 lb (119.3 kg)       Mr. Maribel Cano reports doing fairly well since last visit and is feeling better than he has in a while. Mr. Akilah Davies says he is not having any issues and although his blood pressure was elevated today, this is normal in the 120's most of the time at home. He did have some pain in under his left nipple. He is scheduled to have an ultrasound and mammogram by Dr Iron Lerma MD I honestly think this is a side effect of Entresto. . No further orthopnea or PND. He has severe sleep apnea on home sleep study. Apnea-hypopnea index is 46 and his oxygen saturation dropped to 72%. He is absolutely refusing to go for CPAP titration study. He is insisting that his wife is telling him he sleeps good with no waking up episodes. I went over the sleep study details with him but again patient does not want to try CPAP. I also offered him to see a sleep specialist but again he refused. He is down 5 pounds. Counseled him extensively regarding sleep hygiene. Denies any chest pain, pressure or tightness. No fever or cough. No palpitations, dizziness or lightheadedness. No abdominal pain, nausea or vomiting. No other concern at this point. PAST MEDICAL HISTORY:        Past Medical History:   Diagnosis Date    Chronic combined systolic and diastolic CHF (congestive heart failure) ([address]     History of echocardiogram 10/21/2020    EF 30%    Hypertension     Hypothyroidism    Obstructive sleep apnea syndrome, severe    CURRENT ALLERGIES: Patient has no known allergies. REVIEW OF SYSTEMS: 14 systems were reviewed. Pertinent positives and negatives as above, all else negative.      Past Surgical History:   Procedure Laterality Date    CARDIAC CATHETERIZATION Left 10/22/2020    Right Radial/[facility]/    Social History:  Social History     Tobacco Use    Smoking status: Former Smoker     Packs/day: 1.00     Years: 30.00     Pack years: 30.00     Quit date: 1/1/2011     Years since quitting: 10.4    Smokeless tobacco: Never Used   Vaping Use    Vaping Use: Never used   Substance Use Topics  Alcohol use: Yes     Comment: 4 shots of whiskey daily    Drug use: Never        CURRENT MEDICATIONS:        Outpatient Medications Marked as Taking for the 6/16/21 encounter (Office Visit) with Susie Segovia MD   Medication Sig Dispense Refill    carvedilol (COREG) 12.5 MG tablet Take 1 tablet by mouth 2 times daily (with meals) 180 tablet 3    furosemide (LASIX) 40 MG tablet Take 1 tablet by mouth daily 90 tablet 3    levothyroxine (SYNTHROID) 150 MCG tablet Take 1 tablet by mouth Daily (Patient taking differently: Take 150 mcg by mouth Daily 1/2 tablet on Sunday and Thursday and whole tablet on remaining days.) 90 tablet 3    sacubitril-valsartan (ENTRESTO) 24-26 MG per tablet Take 1 tablet by mouth 2 times daily 56 tablet 0    atorvastatin (LIPITOR) 10 MG tablet Take 1 tablet by mouth daily 30 tablet 3    potassium chloride (KLOR-CON M) 20 MEQ extended release tablet Take 1 tablet by mouth daily 30 tablet 5       FAMILY HISTORY: No significant family history of premature CAD. PHYSICAL EXAMINATION:     BP (!) 154/82 (Site: Left Upper Arm, Position: Sitting, Cuff Size: Medium Adult)   Pulse 62   Resp 16   Ht 5' 11\" (1.803 m)   Wt 254 lb (115.2 kg)   SpO2 97%   BMI 35.43 kg/m²  Body mass index is 35.43 kg/m². Constitutional: He is oriented to person, place, and time. He appears well-developed and well-nourished. In no acute distress. HEENT: Normocephalic and atraumatic. No JVD present. Carotid bruit is not present. No mass and no thyromegaly present. No lymphadenopathy present. Cardiovascular: Normal rate, regular rhythm, normal heart sounds. Exam reveals no gallop and no friction rubs. 1/6 systolic murmur, 4th intercostal space on the LEFT must lateral to the sternal border. Pulmonary/Chest: Effort normal and breath sounds normal. No respiratory distress. He has no wheezes, rhonchi or rales. Abdominal: Soft, non-tender.  Bowel sounds and aorta are normal. He exhibits no organomegaly, mass or bruit. Extremities: Trace-1+ at the ankles bilaterally. No cyanosis or clubbing. 2+ radial and carotid pulses. Distal extremity pulses: 2+ bilaterally. Neurological: He is alert and oriented to person, place, and time. No evidence of gross cranial nerve deficit. Coordination appeared normal.   Skin: Skin is warm and dry. There is no rash or diaphoresis. Psychiatric: He has a normal mood and affect. His speech is normal and behavior is normal.      MOST RECENT LABS ON RECORD:   Lab Results   Component Value Date    WBC 8.1 03/08/2021    HGB 14.4 03/08/2021    HCT 43.8 03/08/2021    PLT See Reflexed IPF Result 03/08/2021    CHOL 116 03/08/2021    TRIG 94 03/08/2021    HDL 43 03/08/2021    ALT 21 03/08/2021    AST 27 03/08/2021     03/08/2021    K 4.4 03/08/2021     03/08/2021    CREATININE 0.59 (L) 03/08/2021    BUN 11 03/08/2021    CO2 26 03/08/2021    TSH 0.62 03/08/2021    PSA 0.75 11/18/2020    LABA1C 5.4 03/08/2021       ASSESSMENT:     Patient Active Problem List    Diagnosis Date Noted    [facility]22/2020    CHF (congestive heart [facility]) [facility] 10/21/2020    Acute on chronic combined systolic and diastolic CHF, NYHA class 3 ([facility] 10/21/2020    Hypothyroid 10/21/2020      Diagnosis Orders   1. Chronic combined systolic (congestive) and diastolic (congestive) heart failure ([facility]     2. Gynecomastia     3. Essential hypertension     4. Severe obstructive sleep apnea     5. Mild CAD     6. Obesity, Class II, BMI 35-39.9         PLAN:         Acute on chronic combined heart failure: NYHA class II    beta Blocker: Continue Carvedilol (Coreg) 12.5 mg twice daily.  ACE Inibitor/ARB: STOP sacubitril/valsartan (Entresto) due to side effects of gynecomastia and START Losartan 100 mg daily.  I also discussed the potential side effects of this medication including lightheadedness and dizziness and instructed them to stop the medication of this occurs and call our office if this occurs.  Diuretics: Continue furosemide (Lasix) 40 mg every morning. Reviewed his blood work that was done by Dr. Savannah Cooper MD kidney function and serum potassium are normal.   Heart failure counseling: I told them to continue wearing lower extremity compression stockings and I advised them to try and keep their legs up whenever possible and to limit salt in their diet. · Gynecomastia  · Stop Entresto due to this issue but will start him on Losartan as listed as above. · Ultrasound today. · I want him to complete the work-up getting bilateral mammogram as well. · Essential Hypertension: Borderline controlled   Beta Blocker: Continue Carvedilol (Coreg) 12.5 mg twice daily.  ACE Inibitor/ARB: STOP sacubitril/valsartan (Entresto) and START losartan (Cozaar) 100 mg daily. I also discussed the potential side effects of this medication including lightheadedness and dizziness and instructed them to stop the medication of this occurs and call our office if this occurs.  Diuretics: Continue furosemide (Lasix) 40 mg every morning.  Severe obstructive Sleep Apnea:   o Although I did re-educate him on the benefits of a CPAP. He does not believe he needs to get a CPAP at this time because he believes he is sleeping well and his wife says he is no longer snoring. I did extensively go over the importance of this and asked him to reconsider this but he still was resistant with this. · Mild nonobstructive CAD   Antiplatelet Agent: Continue Aspirin 81 mg daily.  Beta Blocker: Continue Carvedilol (Coreg) 12.5 mg twice daily.  Anti-anginal medications: Not indicated at this time.  Cholesterol Reduction Therapy: Continue Atorvastatin (Lipitor) 10 mg daily      Additional counseling: I advised them to call our office or go to the emergency room if they developed worsening or persistent chest pain or increased shortness of breath as this could be life threatening.      Obesity: Body mass

## 2021-06-24 ENCOUNTER — HOSPITAL ENCOUNTER (OUTPATIENT)
Dept: WOMENS IMAGING | Age: 61
Discharge: HOME | End: 2021-06-26
Payer: COMMERCIAL

## 2021-06-24 ENCOUNTER — HOSPITAL ENCOUNTER (OUTPATIENT)
Dept: ULTRASOUND IMAGING | Age: 61
Discharge: HOME | End: 2021-06-26
Payer: COMMERCIAL

## 2021-06-24 DIAGNOSIS — N64.4 BREAST PAIN, LEFT: ICD-10-CM

## 2021-06-24 DIAGNOSIS — N62 GYNECOMASTIA: ICD-10-CM

## 2021-06-24 PROCEDURE — G0279 TOMOSYNTHESIS, MAMMO: HCPCS

## 2021-06-24 PROCEDURE — 76641 ULTRASOUND BREAST COMPLETE: CPT

## 2021-06-24 PROCEDURE — 76642 ULTRASOUND BREAST LIMITED: CPT

## 2021-12-15 ENCOUNTER — OFFICE VISIT (OUTPATIENT)
Dept: CARDIOLOGY | Age: 61
End: 2021-12-15
Payer: COMMERCIAL

## 2021-12-15 VITALS
SYSTOLIC BLOOD PRESSURE: 149 MMHG | BODY MASS INDEX: 36.82 KG/M2 | HEIGHT: 71 IN | HEART RATE: 86 BPM | WEIGHT: 263 LBS | DIASTOLIC BLOOD PRESSURE: 80 MMHG | RESPIRATION RATE: 16 BRPM | OXYGEN SATURATION: 96 %

## 2021-12-15 DIAGNOSIS — I10 ESSENTIAL HYPERTENSION: ICD-10-CM

## 2021-12-15 DIAGNOSIS — G47.33 OSA (OBSTRUCTIVE SLEEP APNEA): ICD-10-CM

## 2021-12-15 DIAGNOSIS — E66.9 OBESITY, UNSPECIFIED CLASSIFICATION, UNSPECIFIED OBESITY TYPE, UNSPECIFIED WHETHER SERIOUS COMORBIDITY PRESENT: ICD-10-CM

## 2021-12-15 DIAGNOSIS — I25.10 MILD CAD: ICD-10-CM

## 2021-12-15 DIAGNOSIS — I50.42 CHRONIC COMBINED SYSTOLIC (CONGESTIVE) AND DIASTOLIC (CONGESTIVE) HEART FAILURE (HCC): Primary | ICD-10-CM

## 2021-12-15 DIAGNOSIS — I42.9 IDIOPATHIC CARDIOMYOPATHY (HCC): ICD-10-CM

## 2021-12-15 PROCEDURE — 99214 OFFICE O/P EST MOD 30 MIN: CPT | Performed by: INTERNAL MEDICINE

## 2021-12-15 RX ORDER — CARVEDILOL 25 MG/1
25 TABLET ORAL 2 TIMES DAILY WITH MEALS
Qty: 180 TABLET | Refills: 3 | Status: SHIPPED
Start: 2021-12-15 | End: 2022-06-15 | Stop reason: ALTCHOICE

## 2021-12-15 NOTE — PROGRESS NOTES
I, Zaina Grimaldo RN am scribing for and in the presence of Nhi Ng MD, F.A.C.C..    Patient: Michelle Lunsford  : 1960  Date of Visit: December 15, 2021    History of Present Illness:        Dear Jessica Jeffrey MD    REASON FOR VISIT / CONSULTATION:   Chief Complaint   Patient presents with    Follow-up     Pt reports doing fairly well since last visit. denies CP, SOB, palpitations, dizziness, lightheadedness. Increased 9 pounds since last visit      Dear Dr Aileen Anaya,    I had the pleasure of seeing Michelle Lunsford in my office today for hospital follow up. Mr. Herb Stout is a 59 y.o. male with a history of a history of heart failure. He initially presented to the emergency room on 10/21/2020 because of shortness of breath on exertion, orthopnea and paroxysmal nocturnal dyspnea. Found to have tachycardia, abnormal ECG and acute on chronic combined CHF leading to his most recent cardiac work-up. He has a history of hypertension and dyslipidemia. He said he did not see Dr. Camryn Kim MD since . Denies any history of diabetes. He reported no significant family history of premature CAD. Cardiac catheterization on 10/21/2020 showed mild nonobstructive CAD with 50% stenosis of mid LAD otherwise just minor luminal irregularities. Nothing to explain his low ejection fraction. I reviewed his echo on 10/21/2020, ejection fraction is 35% with severe global hypokinesis and moderately increased left ventricular wall thickness. Picture is suggestive of hypertensive heart disease with heart failure. EKG done office 2020- Normal sinus rhythm 77 bpm    MUGA scan showed 44% on 20     Down 3 pounds since last visit and is doing well. No signs of fluid overload or worsening symptoms.    Wt Readings from Last 3 Encounters:   12/15/21 263 lb (119.3 kg)   21 254 lb (115.2 kg)   20 257 lb 6.4 oz (116.8 kg)       Mr. Herb Stout reports doing fairly well since last visit and says that he really has no complaints other than he has gained some weight over the time. Mr. Rigoberto Anderson reports that normally his blood pressure is normal at home. Denies any chest pain, pressure or tightness. No fever or cough. No palpitations, dizziness or lightheadedness. No abdominal pain, nausea or vomiting. No other concern at this point. He has severe sleep apnea on home sleep study. Apnea-hypopnea index is 46 and his oxygen saturation dropped to 72%. He is absolutely refusing to go for CPAP titration study. He is insisting that his wife is telling him he sleeps good with no waking up episodes. I went over the sleep study details with him but again patient does not want to try CPAP. I also offered him to see a sleep specialist but again he refused. PAST MEDICAL HISTORY:        Past Medical History:   Diagnosis Date    Chronic combined systolic and diastolic CHF (congestive heart failure) ([address]     History of echocardiogram 10/21/2020    EF 30%    Hypertension     Hypothyroidism    Obstructive sleep apnea syndrome, severe    CURRENT ALLERGIES: Entresto [sacubitril-valsartan] REVIEW OF SYSTEMS: 14 systems were reviewed. Pertinent positives and negatives as above, all else negative.      Past Surgical History:   Procedure Laterality Date    CARDIAC CATHETERIZATION Left 10/22/2020    [facility]    Social History:  Social History     Tobacco Use    Smoking status: Former Smoker     Packs/day: 1.00     Years: 30.00     Pack years: 30.00     Quit date: 1/1/2011     Years since quitting: 10.9    Smokeless tobacco: Never Used   Vaping Use    Vaping Use: Never used   Substance Use Topics    Alcohol use: Yes     Comment: 4 shots of whiskey daily    Drug use: Never        CURRENT MEDICATIONS:        Outpatient Medications Marked as Taking for the 12/15/21 encounter (Office Visit) with Itzel Hemphill MD   Medication Sig Dispense Refill    losartan (COZAAR) 100 MG tablet Take 1 tablet by mouth daily 90 tablet 3    carvedilol (COREG) 12.5 MG tablet Take 1 tablet by mouth 2 times daily (with meals) 180 tablet 3    furosemide (LASIX) 40 MG tablet Take 1 tablet by mouth daily 90 tablet 3    levothyroxine (SYNTHROID) 150 MCG tablet Take 1 tablet by mouth Daily (Patient taking differently: Take 150 mcg by mouth Daily 1/2 tablet on Sunday and Thursday and whole tablet on remaining days.) 90 tablet 3    atorvastatin (LIPITOR) 10 MG tablet Take 1 tablet by mouth daily (Patient taking differently: Take 40 mg by mouth daily ) 30 tablet 3    potassium chloride (KLOR-CON M) 20 MEQ extended release tablet Take 1 tablet by mouth daily (Patient taking differently: Take 10 mEq by mouth daily ) 30 tablet 5       FAMILY HISTORY: No significant family history of premature CAD. PHYSICAL EXAMINATION:     BP (!) 155/92 (Site: Left Upper Arm, Position: Sitting, Cuff Size: Medium Adult)   Pulse 86   Resp 16   Ht 5' 11\" (1.803 m)   Wt 263 lb (119.3 kg)   SpO2 96%   BMI 36.68 kg/m²  Body mass index is 36.68 kg/m². Constitutional: He is oriented to person, place, and time. He appears well-developed and well-nourished. In no acute distress. HEENT: Normocephalic and atraumatic. No JVD present. Carotid bruit is not present. No mass and no thyromegaly present. No lymphadenopathy present. Cardiovascular: Normal rate, regular rhythm, normal heart sounds. Exam reveals no gallop and no friction rubs. 1/6 systolic murmur, 4th intercostal space on the LEFT must lateral to the sternal border. Pulmonary/Chest: Effort normal and breath sounds normal. No respiratory distress. He has no wheezes, rhonchi or rales. Abdominal: Soft, non-tender. Bowel sounds and aorta are normal. He exhibits no organomegaly, mass or bruit. Extremities: Trace-1+ at the ankles bilaterally. No cyanosis or clubbing. 2+ radial and carotid pulses. Distal extremity pulses: 2+ bilaterally.    Neurological: He is alert and oriented continue wearing lower extremity compression stockings and I advised them to try and keep their legs up whenever possible and to limit salt in their diet.  Additional Testing: I Ordered an Echocardiogram to assess Mr. José Miguel Wolff ejection fraction and to look for significant valvular heart disease as a source of Mr. Kody Hansen symptoms     · Essential Hypertension: Uncontrolled   Beta Blocker: INCREASE to Carvedilol (Coreg) 25 mg twice daily. I also discussed the potential side effects of this medication including lightheadedness and dizziness and instructed them to stop the medication of this occurs and call our office if this occurs.  ACE Inibitor/ARB: Continue losartan (Cozaar) 100 mg daily.  Diuretics: Continue furosemide (Lasix) 40 mg every morning.  Severe obstructive Sleep Apnea:   o Although I did re-educate him on the benefits of a CPAP. He does not believe he needs to get a CPAP at this time because he believes he is sleeping well and his wife says he is no longer snoring. I did extensively go over the importance of this and asked him to reconsider this but he still was resistant with this. · Mild nonobstructive CAD   Antiplatelet Agent: Continue Aspirin 81 mg daily.  Beta Blocker: INCREASE to Carvedilol (Coreg) 25 mg twice daily. I also discussed the potential side effects of this medication including lightheadedness and dizziness and instructed them to stop the medication of this occurs and call our office if this occurs.  Anti-anginal medications: Not indicated at this time.  Cholesterol Reduction Therapy: Continue Atorvastatin (Lipitor) 10 mg daily      Additional counseling: I advised them to call our office or go to the emergency room if they developed worsening or persistent chest pain or increased shortness of breath as this could be life threatening. Obesity: Body mass index is 36.68 kg/m².    I also briefly discussed both diet and exercise strategies for him to continue to loses weight and he was very receptive to this. Finally, I recommended that he continue his other medications and follow up with you as previously scheduled. FOLLOW UP:   I told Mr. Brenton Runner to call my office if he had any problems, but otherwise I asked him to Return in about 6 months (around 6/15/2022). However, I would be happy to see him sooner should the need arise. Sincerely,  Ramin Coreas MD, F.A.C.C. [facility] Cardiology Specialist    [address] [place] [address]  Phone: [phone], Fax: [phone]     I believe that the risk of significant morbidity and mortality related to the patient's current medical conditions are: intermediate-high. The documentation recorded by the scribe, accurately and completely reflects the services I personally performed and the decisions made by me. Ramin Coreas MD, F.A.C.C.  December 15, 2021

## 2021-12-15 NOTE — PATIENT INSTRUCTIONS
SURVEY:    You may be receiving a survey from [employer] regarding your visit today. Please complete the survey to enable us to provide the highest quality of care to you and your family. If you cannot score us a very good on any question, please call the office to discuss how we could have made your experience a very good one. Thank you.

## 2022-03-14 DIAGNOSIS — E03.9 HYPOTHYROIDISM, UNSPECIFIED TYPE: ICD-10-CM

## 2022-03-14 DIAGNOSIS — I42.9 IDIOPATHIC CARDIOMYOPATHY (HCC): ICD-10-CM

## 2022-03-14 RX ORDER — FUROSEMIDE 40 MG/1
40 TABLET ORAL DAILY
Qty: 90 TABLET | Refills: 3 | Status: SHIPPED | OUTPATIENT
Start: 2022-03-14

## 2022-03-14 RX ORDER — LEVOTHYROXINE SODIUM 0.15 MG/1
150 TABLET ORAL DAILY
Qty: 90 TABLET | Refills: 3 | Status: SHIPPED | OUTPATIENT
Start: 2022-03-14

## 2022-06-13 RX ORDER — POTASSIUM CHLORIDE 20 MEQ/1
20 TABLET, EXTENDED RELEASE ORAL DAILY
Qty: 90 TABLET | Refills: 3 | Status: SHIPPED
Start: 2022-06-13 | End: 2022-06-15 | Stop reason: CLARIF

## 2022-06-13 RX ORDER — LOSARTAN POTASSIUM 100 MG/1
100 TABLET ORAL DAILY
Qty: 90 TABLET | Refills: 3 | Status: SHIPPED | OUTPATIENT
Start: 2022-06-13

## 2022-06-15 ENCOUNTER — OFFICE VISIT (OUTPATIENT)
Dept: CARDIOLOGY | Age: 62
End: 2022-06-15
Payer: COMMERCIAL

## 2022-06-15 VITALS
BODY MASS INDEX: 37.85 KG/M2 | HEART RATE: 101 BPM | DIASTOLIC BLOOD PRESSURE: 83 MMHG | WEIGHT: 270.4 LBS | HEIGHT: 71 IN | SYSTOLIC BLOOD PRESSURE: 133 MMHG | OXYGEN SATURATION: 95 % | RESPIRATION RATE: 18 BRPM

## 2022-06-15 DIAGNOSIS — G47.33 OSA (OBSTRUCTIVE SLEEP APNEA): ICD-10-CM

## 2022-06-15 DIAGNOSIS — I42.9 IDIOPATHIC CARDIOMYOPATHY (HCC): ICD-10-CM

## 2022-06-15 DIAGNOSIS — I50.42 CHRONIC COMBINED SYSTOLIC (CONGESTIVE) AND DIASTOLIC (CONGESTIVE) HEART FAILURE (HCC): ICD-10-CM

## 2022-06-15 DIAGNOSIS — I10 ESSENTIAL HYPERTENSION: ICD-10-CM

## 2022-06-15 DIAGNOSIS — I25.10 MILD CAD: Primary | ICD-10-CM

## 2022-06-15 PROCEDURE — 93000 ELECTROCARDIOGRAM COMPLETE: CPT | Performed by: INTERNAL MEDICINE

## 2022-06-15 PROCEDURE — 99214 OFFICE O/P EST MOD 30 MIN: CPT | Performed by: INTERNAL MEDICINE

## 2022-06-15 RX ORDER — METOPROLOL SUCCINATE 50 MG/1
50 TABLET, EXTENDED RELEASE ORAL DAILY
Qty: 90 TABLET | Refills: 3 | Status: SHIPPED | OUTPATIENT
Start: 2022-06-15

## 2022-06-15 NOTE — PROGRESS NOTES
Darryl Ba am scribing for and in the presence of Min Bush MD, F.A.C.C..    Patient: Live Courtney  : 1960  Date of Visit: Alina 15, 2022    History of Present Illness:        Dear Halie Marquez MD    REASON FOR VISIT / CONSULTATION:   Chief Complaint   Patient presents with    Follow-up     Hx: CHF, idiopathic cardiomyopathy, HTN, LORRI, CAD, Obese. Denies: CP, SOB, dizziness, lightheaded, palps     Dear Dr Aleksandr Case,    I had the pleasure of seeing Live Courtney in my office today for hospital follow up. Mr. Ashwini Mendes is a 57 y.o. male with a history of a history of heart failure. He initially presented to the emergency room on 10/21/2020 because of shortness of breath on exertion, orthopnea and paroxysmal nocturnal dyspnea. Found to have tachycardia, abnormal ECG and acute on chronic combined CHF leading to his most recent cardiac work-up. He has a history of hypertension and dyslipidemia. He said he did not see Dr. Halie Marquez MD since . Denies any history of diabetes. He reported no significant family history of premature CAD. Cardiac catheterization on 10/21/2020 showed mild nonobstructive CAD with 50% stenosis of mid LAD otherwise just minor luminal irregularities. Nothing to explain his low ejection fraction. Echo on 10/21/2020, ejection fraction is 35% with severe global hypokinesis and moderately increased left ventricular wall thickness. Picture is suggestive of hypertensive heart disease with heart failure. EKG done office 2020- Normal sinus rhythm 77 bpm    MUGA scan showed 44% on 20     Wt Readings from Last 3 Encounters:   06/15/22 270 lb 6.4 oz (122.7 kg)   12/15/21 263 lb (119.3 kg)   21 254 lb (115.2 kg)       Mr. Ashwini Mendes is here today for a follow. He has been doing well since his last visit. He has gained 6.5 pounds. He does have an appointment with his primary care doctor today at 3:15 and will have blood work done.  He did just retire on 2022. He has been very busy at home but does have to take more frequent breaks. He does work in his garden. Denies any chest pain, pressure or tightness. No fever or cough. No palpitations, dizziness or lightheadedness. No abdominal pain, nausea or vomiting. No other concern at this point. No blood in his urine or stool. He is sleeping well and he states he asked his wife if he snored or kept her up and she said no. PAST MEDICAL HISTORY:        Past Medical History:   Diagnosis Date    Chronic combined systolic and diastolic CHF (congestive heart failure) ([facility] [address]     History of echocardiogram 10/21/2020    EF 30%    Hypertension     Hypothyroidism    Obstructive sleep apnea syndrome, severe    CURRENT ALLERGIES: Entresto [sacubitril-valsartan] REVIEW OF SYSTEMS: 14 systems were reviewed. Pertinent positives and negatives as above, all else negative. Past Surgical History:   Procedure Laterality Date    CARDIAC CATHETERIZATION Left 10/22/2020    Right Radial/[facility]/    Social History:  Social History     Tobacco Use    Smoking status: Former Smoker     Packs/day: 1.00     Years: 30.00     Pack years: 30.00     Quit date: 2011     Years since quittin.4    Smokeless tobacco: Never Used   Vaping Use    Vaping Use: Never used   Substance Use Topics    Alcohol use: Yes     Comment: 4 shots of whiskey daily    Drug use: Never        CURRENT MEDICATIONS:        Outpatient Medications Marked as Taking for the 6/15/22 encounter (Office Visit) with Ruth Pringle MD   Medication Sig Dispense Refill    losartan (COZAAR) 100 MG tablet Take 1 tablet by mouth daily 90 tablet 3    furosemide (LASIX) 40 MG tablet Take 1 tablet by mouth daily 90 tablet 3    levothyroxine (SYNTHROID) 150 MCG tablet Take 1 tablet by mouth Daily 1/2 tablet on  and Thursday and whole tablet on remaining days.  90 tablet 3    carvedilol (COREG) 25 MG tablet Take 1 tablet by mouth 2 times daily (with meals) 180 tablet 3    atorvastatin (LIPITOR) 10 MG tablet Take 1 tablet by mouth daily (Patient taking differently: Take 40 mg by mouth daily ) 30 tablet 3       FAMILY HISTORY: No significant family history of premature CAD. PHYSICAL EXAMINATION:     /83 (Site: Right Upper Arm, Position: Sitting, Cuff Size: Large Adult)   Pulse (!) 101   Resp 18   Ht 5' 10.98\" (1.803 m)   Wt 270 lb 6.4 oz (122.7 kg)   SpO2 95%   BMI 37.73 kg/m²  Body mass index is 37.73 kg/m². Constitutional: He is oriented to person, place, and time. He appears well-developed and well-nourished. In no acute distress. HEENT: Normocephalic and atraumatic. No JVD present. Carotid bruit is not present. No mass and no thyromegaly present. No lymphadenopathy present. Cardiovascular: Normal rate, regular rhythm, normal heart sounds. Exam reveals no gallop and no friction rubs. 1/6 systolic murmur, 4th intercostal space on the LEFT must lateral to the sternal border. Pulmonary/Chest: Effort normal and breath sounds normal. No respiratory distress. He has no wheezes, rhonchi or rales. Abdominal: Soft, non-tender. Bowel sounds and aorta are normal. He exhibits no organomegaly, mass or bruit. Extremities: 1+ 1/2 up to the knees bilaterally. No cyanosis or clubbing. 2+ radial and carotid pulses. Distal extremity pulses: 2+ bilaterally. Neurological: He is alert and oriented to person, place, and time. No evidence of gross cranial nerve deficit. Coordination appeared normal.   Skin: Skin is warm and dry. There is no rash or diaphoresis. Psychiatric: He has a normal mood and affect.  His speech is normal and behavior is normal.      MOST RECENT LABS ON RECORD:   Lab Results   Component Value Date    WBC 8.1 03/08/2021    HGB 14.4 03/08/2021    HCT 43.8 03/08/2021    PLT See Reflexed IPF Result 03/08/2021    CHOL 116 03/08/2021    TRIG 94 03/08/2021    HDL 43 03/08/2021    ALT 21 03/08/2021    AST 27 03/08/2021  03/08/2021    K 4.4 03/08/2021     03/08/2021    CREATININE 0.59 (L) 03/08/2021    BUN 11 03/08/2021    CO2 26 03/08/2021    TSH 0.62 03/08/2021    PSA 0.75 11/18/2020    LABA1C 5.4 03/08/2021       ASSESSMENT:     Patient Active Problem List    Diagnosis Date Noted    Idiopathic cardiomyopathy ([facility] [address] 10/22/2020    CHF (congestive heart failure) ([facility] [address] 10/21/2020    Acute on chronic combined systolic and diastolic CHF, NYHA class 3 ([facility] [address] 10/21/2020    Hypothyroid 10/21/2020      Diagnosis Orders   1. Mild CAD  EKG 12 lead   2. Essential hypertension  EKG 12 lead   3. LORRI (obstructive sleep apnea)  EKG 12 lead   4. Idiopathic cardiomyopathy (HCC)  EKG 12 lead       PLAN:        · Chronic combined heart failure: NYHA class II    Beta Blocker: STOP Carvedilol (Coreg) and START Metoprolol succinate (Toprol XL) 50 mg daily. This is because of sinus tachycardia and borderline hypotension.  ACE Inibitor/ARB: Continue Losartan 100 mg daily. History of intolerance to Entresto causing gynecomastia.  Diuretics: Continue furosemide (Lasix) 40 mg every morning.  Heart failure counseling: I told them to continue wearing lower extremity compression stockings and I advised them to try and keep their legs up whenever possible and to limit salt in their diet.   Additional Testing List: I took the liberty of ordering a BMP for today to assess their potassium and renal function. I told them that they could get their lab work performed at the location of their choosing, unfortunately, if the lab work was not performed at a [facility]) facility I could not guarantee my ability to follow up with them on their results. ,  I took the liberty of ordering a CBC. I told them that they could get their lab work performed at the location of their choosing, unfortunately, if the lab work was not performed at a [facility]) facility I could not guarantee my ability to follow up with them on their results.   and I ordered a pro BNP level to better assess for the patients level of heart failure. I told them that they could get their lab work performed at the location of their choosing, unfortunately, if the lab work was not performed at a [facility]) facility I could not guarantee my ability to follow up with them on their results. I also ordered a lipid panel to be done to assess LDL level. This can all be done at the same time he completes blood work for Razia Rolon MD     Additional Testing List: I ordered a echocardiogram to better assess for the etiology of this problem and to help guide future management     · Essential Hypertension: Controlled   Beta Blocker: STOP Carvedilol (Coreg) and START Metoprolol succinate (Toprol XL) 50 mg daily. I also discussed the potential side effects of this medication including lightheadedness and dizziness and instructed them to stop the medication of this occurs and call our office if this occurs.  ACE Inibitor/ARB: Continue losartan (Cozaar) 100 mg daily.  Diuretics: Continue furosemide (Lasix) 40 mg every morning.  Severe obstructive Sleep Apnea:   o Refusing CPAP therapy stating he sleeps well at night and does not snore. · Mild nonobstructive CAD   Antiplatelet Agent: Continue Aspirin 81 mg daily.  Beta Blocker: STOP Carvedilol (Coreg) and START Metoprolol succinate (Toprol XL) 50 mg daily.  Anti-anginal medications: Not indicated at this time.  Cholesterol Reduction Therapy: Continue Atorvastatin (Lipitor) 10 mg daily      Additional counseling: I advised them to call our office or go to the emergency room if they developed worsening or persistent chest pain or increased shortness of breath as this could be life threatening. Obesity: Body mass index is 37.73 kg/m². I also briefly discussed both diet and exercise strategies for him to continue to loses weight and he was very receptive to this.     Finally, I recommended that he continue his

## 2022-06-15 NOTE — PATIENT INSTRUCTIONS
SURVEY:    You may be receiving a survey from [employer] regarding your visit today. Please complete the survey to enable us to provide the highest quality of care to you and your family. If you cannot score us a very good on any question, please call the office to discuss how we could have made your experience a very good one. Thank you.

## 2022-12-21 ENCOUNTER — OFFICE VISIT (OUTPATIENT)
Dept: CARDIOLOGY | Age: 62
End: 2022-12-21
Payer: COMMERCIAL

## 2022-12-21 VITALS
DIASTOLIC BLOOD PRESSURE: 82 MMHG | HEART RATE: 93 BPM | SYSTOLIC BLOOD PRESSURE: 149 MMHG | OXYGEN SATURATION: 96 % | RESPIRATION RATE: 18 BRPM | WEIGHT: 263.4 LBS | BODY MASS INDEX: 36.88 KG/M2 | HEIGHT: 71 IN

## 2022-12-21 DIAGNOSIS — G47.33 OSA (OBSTRUCTIVE SLEEP APNEA): ICD-10-CM

## 2022-12-21 DIAGNOSIS — N62 GYNECOMASTIA: ICD-10-CM

## 2022-12-21 DIAGNOSIS — G47.33 SEVERE OBSTRUCTIVE SLEEP APNEA: ICD-10-CM

## 2022-12-21 DIAGNOSIS — I25.10 MILD CAD: ICD-10-CM

## 2022-12-21 DIAGNOSIS — I42.9 IDIOPATHIC CARDIOMYOPATHY (HCC): ICD-10-CM

## 2022-12-21 DIAGNOSIS — I50.42 CHRONIC COMBINED SYSTOLIC (CONGESTIVE) AND DIASTOLIC (CONGESTIVE) HEART FAILURE (HCC): ICD-10-CM

## 2022-12-21 DIAGNOSIS — E66.9 OBESITY, UNSPECIFIED CLASSIFICATION, UNSPECIFIED OBESITY TYPE, UNSPECIFIED WHETHER SERIOUS COMORBIDITY PRESENT: ICD-10-CM

## 2022-12-21 DIAGNOSIS — I10 ESSENTIAL HYPERTENSION: Primary | ICD-10-CM

## 2022-12-21 PROCEDURE — 3078F DIAST BP <80 MM HG: CPT | Performed by: INTERNAL MEDICINE

## 2022-12-21 PROCEDURE — 99213 OFFICE O/P EST LOW 20 MIN: CPT | Performed by: INTERNAL MEDICINE

## 2022-12-21 PROCEDURE — 3074F SYST BP LT 130 MM HG: CPT | Performed by: INTERNAL MEDICINE

## 2022-12-21 RX ORDER — METOPROLOL SUCCINATE 50 MG/1
50 TABLET, EXTENDED RELEASE ORAL 2 TIMES DAILY
Qty: 180 TABLET | Refills: 3 | Status: SHIPPED | OUTPATIENT
Start: 2022-12-21

## 2022-12-21 NOTE — PATIENT INSTRUCTIONS
SURVEY:    You may be receiving a survey from [employer] regarding your visit today. Please complete the survey to enable us to provide the highest quality of care to you and your family. If you cannot score us a very good on any question, please call the office to discuss how we could have made your experience a very good one. Thank you.

## 2022-12-21 NOTE — PROGRESS NOTES
Leslye Kapoor am scribing for and in the presence of Pepe Cisse MD, F.A.C.C..    Patient: Aric Rowley  : 1960  Date of Visit: 2022    History of Present Illness:        Dear Brian Joshi MD    REASON FOR VISIT / CONSULTATION:   Chief Complaint   Patient presents with    Follow-up     HX:mild CAD, HTN, LORRI, idiopathic cardiomyopathy Pt is here for follow up he states he is doing well Denies:CP, SOB, lightheaded/dizziness,palp     Dear Dr Tony Blount,    I had the pleasure of seeing Aric Rowley in my office today for hospital follow up. Mr. Onesimo Hammans is a 57 y.o. male with a history of a history of heart failure. He initially presented to the emergency room on 10/21/2020 because of shortness of breath on exertion, orthopnea and paroxysmal nocturnal dyspnea. Found to have tachycardia, abnormal ECG and acute on chronic combined CHF leading to his most recent cardiac work-up. He has a history of hypertension and dyslipidemia. He said he did not see Dr. Brian Joshi MD since . Denies any history of diabetes. He reported no significant family history of premature CAD. Cardiac catheterization on 10/21/2020 showed mild nonobstructive CAD with 50% stenosis of mid LAD otherwise just minor luminal irregularities. Nothing to explain his low ejection fraction. Echo on 10/21/2020, ejection fraction is 35% with severe global hypokinesis and moderately increased left ventricular wall thickness. Picture is suggestive of hypertensive heart disease with heart failure. EKG done office 2020- Normal sinus rhythm 77 bpm    MUGA scan showed 44% on 20     Wt Readings from Last 3 Encounters:   22 263 lb 6.4 oz (119.5 kg)   06/15/22 270 lb 6.4 oz (122.7 kg)   12/15/21 263 lb (119.3 kg)       Mr. Onesimo Hammans is here today for a follow. He states he is doing well. He is not using CPAP at night but thinks he is sleeping fine.  He is staying active working around his house. He is able to do what he needs done comfortably besides knee pain. Denies any chest pain, pressure or tightness. No fever or cough. No palpitations, dizziness or lightheadedness. No abdominal pain, nausea or vomiting. No other concern at this point. No blood in his urine or stool. PAST MEDICAL HISTORY:        Past Medical History:   Diagnosis Date    Chronic combined systolic and diastolic CHF (congestive heart failure) ([facility] [address]     History of echocardiogram 10/21/2020    EF 30%    Hypertension     Hypothyroidism    Obstructive sleep apnea syndrome, severe    CURRENT ALLERGIES: Entresto [sacubitril-valsartan] REVIEW OF SYSTEMS: 14 systems were reviewed. Pertinent positives and negatives as above, all else negative. Past Surgical History:   Procedure Laterality Date    CARDIAC CATHETERIZATION Left 10/22/2020    Right Radial/[facility]/    Social History:  Social History     Tobacco Use    Smoking status: Former     Packs/day: 1.00     Years: 30.00     Pack years: 30.00     Types: Cigarettes     Quit date: 2011     Years since quittin.9    Smokeless tobacco: Never   Vaping Use    Vaping Use: Never used   Substance Use Topics    Alcohol use: Yes     Comment: 4 shots of whiskey daily    Drug use: Never        CURRENT MEDICATIONS:        Outpatient Medications Marked as Taking for the 22 encounter (Office Visit) with Mahad Esparza MD   Medication Sig Dispense Refill    metoprolol succinate (TOPROL XL) 50 MG extended release tablet Take 1 tablet by mouth daily 90 tablet 3    losartan (COZAAR) 100 MG tablet Take 1 tablet by mouth daily 90 tablet 3    furosemide (LASIX) 40 MG tablet Take 1 tablet by mouth daily 90 tablet 3    levothyroxine (SYNTHROID) 150 MCG tablet Take 1 tablet by mouth Daily 1/2 tablet on  and Thursday and whole tablet on remaining days.  90 tablet 3    atorvastatin (LIPITOR) 10 MG tablet Take 1 tablet by mouth daily (Patient taking differently: Take 03/08/2021    PSA 0.75 11/18/2020    LABA1C 5.4 03/08/2021       ASSESSMENT:     Patient Active Problem List    Diagnosis Date Noted    Idiopathic cardiomyopathy ([facility] 10/22/2020    CHF (congestive heart failure) ([facility] 10/21/2020    Acute on chronic combined systolic and diastolic CHF, NYHA class 3 ([facility] 10/21/2020    Hypothyroid 10/21/2020      Diagnosis Orders   1. Essential hypertension        2. Chronic combined systolic (congestive) and diastolic (congestive) heart failure ([facility]        3. LORRI (obstructive sleep apnea)        4. Mild CAD        5. Obesity, unspecified classification, unspecified obesity type, unspecified whether serious comorbidity present        6. Severe obstructive sleep apnea        7. Gynecomastia            PLAN:        Chronic combined heart failure: NYHA class II   Beta Blocker: INCREASE to Metoprolol succinate (Toprol XL) 50 mg twice daily. ACE Inibitor/ARB: Continue Losartan 100 mg daily. History of intolerance to Entresto causing gynecomastia. Diuretics: Continue furosemide (Lasix) 40 mg every morning. Heart failure counseling: I told them to continue wearing lower extremity compression stockings and I advised them to try and keep their legs up whenever possible and to limit salt in their diet. I ordered Echo previously and want him to get it. I told him its very important to reassess his ejection fraction to determine future need for defibrillation therapy. Essential Hypertension: Controlled  Beta Blocker: INCREASE to Metoprolol succinate (Toprol XL) 50 mg twice daily. ACE Inibitor/ARB: Continue losartan (Cozaar) 100 mg daily. Diuretics: Continue furosemide (Lasix) 40 mg every morning. Severe obstructive Sleep Apnea:   Refusing CPAP therapy stating he sleeps well at night and does not snore. Mild nonobstructive CAD  Antiplatelet Agent: Continue Aspirin 81 mg daily. Beta Blocker: INCREASE to Metoprolol succinate (Toprol XL) 50 mg twice daily.  I also discussed the potential side effects of this medication including lightheadedness and dizziness and instructed them to stop the medication of this occurs and call our office if this occurs. Anti-anginal medications: Not indicated at this time. Cholesterol Reduction Therapy: Continue Atorvastatin (Lipitor) 10 mg daily     Additional counseling: I advised them to call our office or go to the emergency room if they developed worsening or persistent chest pain or increased shortness of breath as this could be life threatening. Obesity: Body mass index is 36.74 kg/m². I also briefly discussed both diet and exercise strategies for him to continue to loses weight and he was very receptive to this. Finally, I recommended that he continue his other medications and follow up with you as previously scheduled. FOLLOW UP:   I told Mr. Elvis Barton to call my office if he had any problems, but otherwise I asked him to Return in about 6 months (around 6/21/2023). However, I would be happy to see him sooner should the need arise. Sincerely,  Sera Castillo MD, F.A.C.C. [facility] Cardiology Specialist    [facility] [address] [facility], [address] [facility] [address]  Phone: [phone], Fax: [phone]     I believe that the risk of significant morbidity and mortality related to the patient's current medical conditions are: Intermediate. Approximately 25 minutes were spent during prep work, discussion and exam of the patient, and follow up documentation and all of their questions were answered. The documentation recorded by the scribe, accurately and completely reflects the services I personally performed and the decisions made by me. Sera Castillo MD, F.A.C.C.  December 21, 2022

## 2023-01-17 ENCOUNTER — HOSPITAL ENCOUNTER (OUTPATIENT)
Dept: NON INVASIVE DIAGNOSTICS | Age: 63
Discharge: HOME | End: 2023-01-17
Payer: COMMERCIAL

## 2023-01-17 DIAGNOSIS — I42.9 IDIOPATHIC CARDIOMYOPATHY (HCC): ICD-10-CM

## 2023-01-17 DIAGNOSIS — G47.33 OSA (OBSTRUCTIVE SLEEP APNEA): ICD-10-CM

## 2023-01-17 DIAGNOSIS — I10 ESSENTIAL HYPERTENSION: ICD-10-CM

## 2023-01-17 DIAGNOSIS — I50.42 CHRONIC COMBINED SYSTOLIC (CONGESTIVE) AND DIASTOLIC (CONGESTIVE) HEART FAILURE (HCC): ICD-10-CM

## 2023-01-17 DIAGNOSIS — I25.10 MILD CAD: ICD-10-CM

## 2023-01-17 LAB
LV EF: 60 %
LVEF MODALITY: NORMAL

## 2023-01-17 PROCEDURE — 93306 TTE W/DOPPLER COMPLETE: CPT

## 2023-01-19 ENCOUNTER — TELEPHONE (OUTPATIENT)
Dept: CARDIOLOGY | Age: 63
End: 2023-01-19

## 2023-01-19 NOTE — TELEPHONE ENCOUNTER
----- Message from Gay Walls MD sent at 1/18/2023  8:12 PM EST -----  Echo is good.   Heart function improved and is now normal.  Thank you

## 2023-03-12 DIAGNOSIS — E03.9 HYPOTHYROIDISM, UNSPECIFIED TYPE: ICD-10-CM

## 2023-03-12 DIAGNOSIS — I42.9 IDIOPATHIC CARDIOMYOPATHY (HCC): ICD-10-CM

## 2023-03-13 RX ORDER — FUROSEMIDE 40 MG/1
TABLET ORAL
Qty: 90 TABLET | Refills: 3 | Status: SHIPPED | OUTPATIENT
Start: 2023-03-13

## 2023-05-31 DIAGNOSIS — E03.9 HYPOTHYROIDISM, UNSPECIFIED TYPE: ICD-10-CM

## 2023-05-31 DIAGNOSIS — I42.9 IDIOPATHIC CARDIOMYOPATHY (HCC): ICD-10-CM

## 2023-06-01 RX ORDER — LOSARTAN POTASSIUM 100 MG/1
TABLET ORAL
Qty: 90 TABLET | Refills: 3 | Status: SHIPPED | OUTPATIENT
Start: 2023-06-01

## 2023-06-05 RX ORDER — LEVOTHYROXINE SODIUM 0.15 MG/1
TABLET ORAL
Qty: 26 TABLET | Refills: 0 | Status: SHIPPED | OUTPATIENT
Start: 2023-06-05 | End: 2023-06-21 | Stop reason: SDUPTHER

## 2023-06-21 ENCOUNTER — OFFICE VISIT (OUTPATIENT)
Dept: CARDIOLOGY | Age: 63
End: 2023-06-21
Payer: MEDICARE

## 2023-06-21 ENCOUNTER — HOSPITAL ENCOUNTER (OUTPATIENT)
Age: 63
Discharge: HOME | End: 2023-06-21
Payer: MEDICARE

## 2023-06-21 VITALS
BODY MASS INDEX: 37.88 KG/M2 | HEIGHT: 71 IN | HEART RATE: 91 BPM | OXYGEN SATURATION: 96 % | WEIGHT: 270.6 LBS | SYSTOLIC BLOOD PRESSURE: 130 MMHG | RESPIRATION RATE: 18 BRPM | DIASTOLIC BLOOD PRESSURE: 71 MMHG

## 2023-06-21 DIAGNOSIS — E03.9 HYPOTHYROIDISM, UNSPECIFIED TYPE: ICD-10-CM

## 2023-06-21 DIAGNOSIS — G47.33 OSA (OBSTRUCTIVE SLEEP APNEA): ICD-10-CM

## 2023-06-21 DIAGNOSIS — I42.9 IDIOPATHIC CARDIOMYOPATHY (HCC): ICD-10-CM

## 2023-06-21 DIAGNOSIS — I50.42 CHRONIC COMBINED SYSTOLIC (CONGESTIVE) AND DIASTOLIC (CONGESTIVE) HEART FAILURE (HCC): ICD-10-CM

## 2023-06-21 DIAGNOSIS — I25.10 MILD CAD: ICD-10-CM

## 2023-06-21 DIAGNOSIS — I10 ESSENTIAL HYPERTENSION: ICD-10-CM

## 2023-06-21 LAB
CHOLEST SERPL-MCNC: 166 MG/DL
CHOLESTEROL/HDL RATIO: 3.3
ERYTHROCYTE [DISTWIDTH] IN BLOOD BY AUTOMATED COUNT: 12.4 % (ref 11.8–14.4)
HCT VFR BLD AUTO: 41.8 % (ref 40.7–50.3)
HDLC SERPL-MCNC: 51 MG/DL
HGB BLD-MCNC: 14.7 G/DL (ref 13–17)
LDLC SERPL CALC-MCNC: 86 MG/DL (ref 0–130)
MCH RBC QN AUTO: 35.9 PG (ref 25.2–33.5)
MCHC RBC AUTO-ENTMCNC: 35.2 G/DL (ref 28.4–34.8)
MCV RBC AUTO: 102.2 FL (ref 82.6–102.9)
NRBC AUTOMATED: 0 PER 100 WBC
PLATELET # BLD AUTO: ABNORMAL K/UL (ref 138–453)
PLATELET, FLUORESCENCE: 132 K/UL (ref 138–453)
PLATELETS.RETICULATED NFR BLD AUTO: 7.2 % (ref 1.1–10.3)
RBC # BLD AUTO: 4.09 M/UL (ref 4.21–5.77)
TRIGL SERPL-MCNC: 143 MG/DL
WBC OTHER # BLD: 8.7 K/UL (ref 3.5–11.3)

## 2023-06-21 PROCEDURE — G8417 CALC BMI ABV UP PARAM F/U: HCPCS | Performed by: INTERNAL MEDICINE

## 2023-06-21 PROCEDURE — 36415 COLL VENOUS BLD VENIPUNCTURE: CPT

## 2023-06-21 PROCEDURE — 99214 OFFICE O/P EST MOD 30 MIN: CPT | Performed by: INTERNAL MEDICINE

## 2023-06-21 PROCEDURE — 80061 LIPID PANEL: CPT

## 2023-06-21 PROCEDURE — 3075F SYST BP GE 130 - 139MM HG: CPT | Performed by: INTERNAL MEDICINE

## 2023-06-21 PROCEDURE — 1123F ACP DISCUSS/DSCN MKR DOCD: CPT | Performed by: INTERNAL MEDICINE

## 2023-06-21 PROCEDURE — G8427 DOCREV CUR MEDS BY ELIG CLIN: HCPCS | Performed by: INTERNAL MEDICINE

## 2023-06-21 PROCEDURE — 3078F DIAST BP <80 MM HG: CPT | Performed by: INTERNAL MEDICINE

## 2023-06-21 PROCEDURE — 99211 OFF/OP EST MAY X REQ PHY/QHP: CPT | Performed by: INTERNAL MEDICINE

## 2023-06-21 PROCEDURE — 3017F COLORECTAL CA SCREEN DOC REV: CPT | Performed by: INTERNAL MEDICINE

## 2023-06-21 PROCEDURE — 85027 COMPLETE CBC AUTOMATED: CPT

## 2023-06-21 PROCEDURE — 1036F TOBACCO NON-USER: CPT | Performed by: INTERNAL MEDICINE

## 2023-06-21 RX ORDER — POTASSIUM CHLORIDE 750 MG/1
10 TABLET, FILM COATED, EXTENDED RELEASE ORAL DAILY
COMMUNITY
Start: 2023-06-19

## 2023-06-21 RX ORDER — ATORVASTATIN CALCIUM 40 MG/1
40 TABLET, FILM COATED ORAL DAILY
COMMUNITY
Start: 2023-05-15

## 2023-06-21 RX ORDER — LEVOTHYROXINE SODIUM 0.15 MG/1
150 TABLET ORAL DAILY
Qty: 90 TABLET | Refills: 3 | Status: SHIPPED | OUTPATIENT
Start: 2023-06-21

## 2023-06-21 RX ORDER — METOPROLOL SUCCINATE 50 MG/1
50 TABLET, EXTENDED RELEASE ORAL 2 TIMES DAILY
Qty: 180 TABLET | Refills: 3 | Status: SHIPPED | OUTPATIENT
Start: 2023-06-21

## 2023-06-21 NOTE — PATIENT INSTRUCTIONS
SURVEY:    You may be receiving a survey from [employer] regarding your visit today. Please complete the survey to enable us to provide the highest quality of care to you and your family. If you cannot score us a very good on any question, please call the office to discuss how we could have made your experience a very good one. Thank you.

## 2023-06-21 NOTE — PROGRESS NOTES
Jose Abel am scribing for and in the presence of May Bowles MD, F.A.C.C..    Patient: Misti Garnett  : 1960  Date of Visit: 2023    History of Present Illness:        Dear López Diaz MD    REASON FOR VISIT / CONSULTATION:   Chief Complaint   Patient presents with    Coronary Artery Disease     HX: Mild CAD, HTN, LORRI, Idiopathic Cardiomyopathy. Echo completed on 2023. HFC. He has been doing well. Breathing stable, no swelling. Has been doing therapy for his knee's. Denies: CP, SOB, Lightheaded/dizziness, Palpitations. Dear Dr Karolyn Whitlock,    I had the pleasure of seeing Misti Garnett in my office today for hospital follow up. Mr. Colby Felix is a 70 y.o. male with a history of a history of heart failure. He initially presented to the emergency room on 10/21/2020 because of shortness of breath on exertion, orthopnea and paroxysmal nocturnal dyspnea. Found to have tachycardia, abnormal ECG and acute on chronic combined CHF leading to his most recent cardiac work-up. He has a history of hypertension and dyslipidemia. He said he did not see Dr. López Diaz MD since . Denies any history of diabetes. He reported no significant family history of premature CAD. Cardiac catheterization on 10/21/2020 showed mild nonobstructive CAD with 50% stenosis of mid LAD otherwise just minor luminal irregularities. Nothing to explain his low ejection fraction. Echo on 10/21/2020, ejection fraction is 35% with severe global hypokinesis and moderately increased left ventricular wall thickness. Picture is suggestive of hypertensive heart disease with heart failure. EKG done office 2020- Normal sinus rhythm 77 bpm    MUGA scan showed 44% on 20     Echocardiogram done on 2023: EF of 60% LV wall thickness is mild to moderately increased. The left atrium is mildly dilated (29-33) with a left atrial volume index of 29 ml/m2.  Anterior free space seen

## 2023-06-22 ENCOUNTER — TELEPHONE (OUTPATIENT)
Dept: CARDIOLOGY | Age: 63
End: 2023-06-22

## 2023-06-22 DIAGNOSIS — G47.33 OSA (OBSTRUCTIVE SLEEP APNEA): Primary | ICD-10-CM

## 2023-06-22 NOTE — TELEPHONE ENCOUNTER
----- Message from Michaela Morales MD sent at 6/21/2023  7:25 PM EDT -----  Blood work is good. Continue current therapy and follow up.  Thank you

## 2023-06-30 ENCOUNTER — HOSPITAL ENCOUNTER (OUTPATIENT)
Dept: MRI IMAGING | Age: 63
End: 2023-06-30
Payer: MEDICARE

## 2023-06-30 DIAGNOSIS — M25.561 RIGHT KNEE PAIN, UNSPECIFIED CHRONICITY: ICD-10-CM

## 2023-06-30 DIAGNOSIS — M17.11 UNILATERAL PRIMARY OSTEOARTHRITIS, RIGHT KNEE: ICD-10-CM

## 2023-06-30 PROCEDURE — 73721 MRI JNT OF LWR EXTRE W/O DYE: CPT

## 2023-12-13 ENCOUNTER — OFFICE VISIT (OUTPATIENT)
Dept: CARDIOLOGY | Age: 63
End: 2023-12-13
Payer: MEDICARE

## 2023-12-13 VITALS
WEIGHT: 276 LBS | RESPIRATION RATE: 18 BRPM | BODY MASS INDEX: 38.64 KG/M2 | HEART RATE: 96 BPM | HEIGHT: 71 IN | OXYGEN SATURATION: 95 % | DIASTOLIC BLOOD PRESSURE: 75 MMHG | SYSTOLIC BLOOD PRESSURE: 139 MMHG

## 2023-12-13 DIAGNOSIS — R00.0 TACHYCARDIA: Primary | ICD-10-CM

## 2023-12-13 DIAGNOSIS — I10 ESSENTIAL HYPERTENSION: ICD-10-CM

## 2023-12-13 DIAGNOSIS — I50.42 CHRONIC COMBINED SYSTOLIC (CONGESTIVE) AND DIASTOLIC (CONGESTIVE) HEART FAILURE (HCC): ICD-10-CM

## 2023-12-13 DIAGNOSIS — G47.33 OSA (OBSTRUCTIVE SLEEP APNEA): ICD-10-CM

## 2023-12-13 DIAGNOSIS — E66.9 CLASS 2 OBESITY WITHOUT SERIOUS COMORBIDITY WITH BODY MASS INDEX (BMI) OF 38.0 TO 38.9 IN ADULT, UNSPECIFIED OBESITY TYPE: ICD-10-CM

## 2023-12-13 DIAGNOSIS — E78.2 MIXED HYPERLIPIDEMIA: ICD-10-CM

## 2023-12-13 DIAGNOSIS — I25.10 MILD CAD: ICD-10-CM

## 2023-12-13 DIAGNOSIS — I42.9 IDIOPATHIC CARDIOMYOPATHY (HCC): ICD-10-CM

## 2023-12-13 PROCEDURE — G8484 FLU IMMUNIZE NO ADMIN: HCPCS | Performed by: INTERNAL MEDICINE

## 2023-12-13 PROCEDURE — G8417 CALC BMI ABV UP PARAM F/U: HCPCS | Performed by: INTERNAL MEDICINE

## 2023-12-13 PROCEDURE — 99211 OFF/OP EST MAY X REQ PHY/QHP: CPT | Performed by: INTERNAL MEDICINE

## 2023-12-13 PROCEDURE — 3017F COLORECTAL CA SCREEN DOC REV: CPT | Performed by: INTERNAL MEDICINE

## 2023-12-13 PROCEDURE — 93010 ELECTROCARDIOGRAM REPORT: CPT | Performed by: INTERNAL MEDICINE

## 2023-12-13 PROCEDURE — 99214 OFFICE O/P EST MOD 30 MIN: CPT | Performed by: INTERNAL MEDICINE

## 2023-12-13 PROCEDURE — 1123F ACP DISCUSS/DSCN MKR DOCD: CPT | Performed by: INTERNAL MEDICINE

## 2023-12-13 PROCEDURE — 3078F DIAST BP <80 MM HG: CPT | Performed by: INTERNAL MEDICINE

## 2023-12-13 PROCEDURE — 1036F TOBACCO NON-USER: CPT | Performed by: INTERNAL MEDICINE

## 2023-12-13 PROCEDURE — G8427 DOCREV CUR MEDS BY ELIG CLIN: HCPCS | Performed by: INTERNAL MEDICINE

## 2023-12-13 PROCEDURE — 93005 ELECTROCARDIOGRAM TRACING: CPT | Performed by: INTERNAL MEDICINE

## 2023-12-13 PROCEDURE — 3075F SYST BP GE 130 - 139MM HG: CPT | Performed by: INTERNAL MEDICINE

## 2023-12-13 RX ORDER — METOPROLOL SUCCINATE 100 MG/1
100 TABLET, EXTENDED RELEASE ORAL DAILY
Qty: 90 TABLET | Refills: 3 | Status: SHIPPED | OUTPATIENT
Start: 2023-12-13

## 2023-12-13 RX ORDER — METOPROLOL SUCCINATE 50 MG/1
50 TABLET, EXTENDED RELEASE ORAL NIGHTLY
Qty: 90 TABLET | Refills: 3 | Status: SHIPPED | OUTPATIENT
Start: 2023-12-13

## 2023-12-13 NOTE — PROGRESS NOTES
Raymond Flores am scribing for and in the presence of Yariel Manriquez MD, F.A.C.C..    Patient: Michelle Arnold  : 1960  Date of Visit: 2023    History of Present Illness:        REASON FOR VISIT / CONSULTATION:   Chief Complaint   Patient presents with    Congestive Heart Failure     HX: CHF, Mild CAD. Pt states he is doing well. Denies: Cp, Palpitations, Lighteaded/ dizzienss, SOB. Dear Dr. Jose Lares MD,    I had the pleasure of seeing Michelle Arnlod in my office today for hospital follow up. Mr. Eric Keating is a 70 y.o. male with a history of a history of heart failure. He initially presented to the emergency room on 10/21/2020 because of shortness of breath on exertion, orthopnea and paroxysmal nocturnal dyspnea. Found to have tachycardia, abnormal ECG and acute on chronic combined CHF leading to his most recent cardiac work-up. He has a history of hypertension and dyslipidemia. He said he did not see Dr. Jose Lares MD since . Denies any history of diabetes. He reported no significant family history of premature CAD. Cardiac catheterization on 10/21/2020 showed mild nonobstructive CAD with 50% stenosis of mid LAD otherwise just minor luminal irregularities. Nothing to explain his low ejection fraction. Echo on 10/21/2020, ejection fraction is 35% with severe global hypokinesis and moderately increased left ventricular wall thickness. Picture is suggestive of hypertensive heart disease with heart failure. EKG done office 2020- Normal sinus rhythm 77 bpm    MUGA scan showed 44% on 20     Echocardiogram done on 2023: EF of 60% LV wall thickness is mild to moderately increased. The left atrium is mildly dilated (29-33) with a left atrial volume index of 29 ml/m2. Anterior free space seen consistent with a small pericardial effusion or fat pad. Evidence of mild diastolic dysfunction is seen.     Wt Readings from Last 3 Encounters:

## 2023-12-13 NOTE — PATIENT INSTRUCTIONS
SURVEY:    You may be receiving a survey from [employer] regarding your visit today. Please complete the survey to enable us to provide the highest quality of care to you and your family. If you cannot score us a very good on any question, please call the office to discuss how we could have made your experience a very good one. Thank you.

## 2024-02-04 DIAGNOSIS — I42.9 IDIOPATHIC CARDIOMYOPATHY (HCC): ICD-10-CM

## 2024-02-04 DIAGNOSIS — E03.9 HYPOTHYROIDISM, UNSPECIFIED TYPE: ICD-10-CM

## 2024-02-05 RX ORDER — FUROSEMIDE 40 MG/1
TABLET ORAL
Qty: 90 TABLET | Refills: 3 | Status: SHIPPED | OUTPATIENT
Start: 2024-02-05

## 2024-05-29 DIAGNOSIS — E03.9 HYPOTHYROIDISM, UNSPECIFIED TYPE: ICD-10-CM

## 2024-05-29 DIAGNOSIS — I42.9 IDIOPATHIC CARDIOMYOPATHY (HCC): ICD-10-CM

## 2024-05-30 RX ORDER — LOSARTAN POTASSIUM 100 MG/1
100 TABLET ORAL DAILY
Qty: 90 TABLET | Refills: 3 | Status: SHIPPED | OUTPATIENT
Start: 2024-05-30

## 2024-05-30 RX ORDER — LEVOTHYROXINE SODIUM 0.15 MG/1
150 TABLET ORAL DAILY
Qty: 90 TABLET | Refills: 3 | OUTPATIENT
Start: 2024-05-30

## 2024-07-08 DIAGNOSIS — I42.9 IDIOPATHIC CARDIOMYOPATHY (HCC): ICD-10-CM

## 2024-07-08 DIAGNOSIS — E03.9 HYPOTHYROIDISM, UNSPECIFIED TYPE: ICD-10-CM

## 2024-07-08 RX ORDER — LEVOTHYROXINE SODIUM 0.15 MG/1
150 TABLET ORAL DAILY
Qty: 90 TABLET | Refills: 3 | Status: SHIPPED | OUTPATIENT
Start: 2024-07-08

## 2024-12-02 DIAGNOSIS — E78.2 MIXED HYPERLIPIDEMIA: ICD-10-CM

## 2024-12-02 DIAGNOSIS — G47.33 OSA (OBSTRUCTIVE SLEEP APNEA): ICD-10-CM

## 2024-12-02 DIAGNOSIS — I25.10 MILD CAD: ICD-10-CM

## 2024-12-02 DIAGNOSIS — I50.42 CHRONIC COMBINED SYSTOLIC (CONGESTIVE) AND DIASTOLIC (CONGESTIVE) HEART FAILURE (HCC): ICD-10-CM

## 2024-12-02 DIAGNOSIS — I42.9 IDIOPATHIC CARDIOMYOPATHY (HCC): ICD-10-CM

## 2024-12-02 DIAGNOSIS — I10 ESSENTIAL HYPERTENSION: ICD-10-CM

## 2024-12-02 DIAGNOSIS — E66.812 CLASS 2 OBESITY WITHOUT SERIOUS COMORBIDITY WITH BODY MASS INDEX (BMI) OF 38.0 TO 38.9 IN ADULT, UNSPECIFIED OBESITY TYPE: ICD-10-CM

## 2024-12-02 RX ORDER — METOPROLOL SUCCINATE 100 MG/1
TABLET, EXTENDED RELEASE ORAL
Qty: 90 TABLET | Refills: 3 | Status: SHIPPED | OUTPATIENT
Start: 2024-12-02

## 2024-12-18 ENCOUNTER — OFFICE VISIT (OUTPATIENT)
Dept: CARDIOLOGY | Age: 64
End: 2024-12-18
Payer: MEDICARE

## 2024-12-18 ENCOUNTER — HOSPITAL ENCOUNTER (OUTPATIENT)
Age: 64
Discharge: HOME | End: 2024-12-18
Payer: MEDICARE

## 2024-12-18 VITALS
BODY MASS INDEX: 40.04 KG/M2 | WEIGHT: 286 LBS | HEART RATE: 83 BPM | HEIGHT: 71 IN | OXYGEN SATURATION: 95 % | SYSTOLIC BLOOD PRESSURE: 130 MMHG | RESPIRATION RATE: 18 BRPM | DIASTOLIC BLOOD PRESSURE: 70 MMHG

## 2024-12-18 DIAGNOSIS — E11.649 UNCONTROLLED TYPE 2 DIABETES MELLITUS WITH HYPOGLYCEMIA, UNSPECIFIED HYPOGLYCEMIA COMA STATUS (HCC): ICD-10-CM

## 2024-12-18 DIAGNOSIS — I10 ESSENTIAL HYPERTENSION: ICD-10-CM

## 2024-12-18 DIAGNOSIS — I50.42 CHRONIC COMBINED SYSTOLIC (CONGESTIVE) AND DIASTOLIC (CONGESTIVE) HEART FAILURE (HCC): ICD-10-CM

## 2024-12-18 DIAGNOSIS — E78.2 MIXED HYPERLIPIDEMIA: ICD-10-CM

## 2024-12-18 DIAGNOSIS — I42.9 CARDIOMYOPATHY, UNSPECIFIED TYPE (HCC): ICD-10-CM

## 2024-12-18 DIAGNOSIS — G47.33 OSA (OBSTRUCTIVE SLEEP APNEA): ICD-10-CM

## 2024-12-18 DIAGNOSIS — I42.9 IDIOPATHIC CARDIOMYOPATHY (HCC): ICD-10-CM

## 2024-12-18 DIAGNOSIS — I25.10 MILD CAD: ICD-10-CM

## 2024-12-18 DIAGNOSIS — I25.10 MILD CAD: Primary | ICD-10-CM

## 2024-12-18 LAB
ALT SERPL-CCNC: 32 U/L (ref 10–50)
ANION GAP SERPL CALCULATED.3IONS-SCNC: 10 MMOL/L (ref 9–16)
AST SERPL-CCNC: 70 U/L (ref 10–50)
BNP SERPL-MCNC: <36 PG/ML (ref 0–125)
BUN SERPL-MCNC: 6 MG/DL (ref 8–23)
BUN/CREAT SERPL: 9 (ref 9–20)
CALCIUM SERPL-MCNC: 9.3 MG/DL (ref 8.6–10.4)
CHLORIDE SERPL-SCNC: 100 MMOL/L (ref 98–107)
CHOLEST SERPL-MCNC: 145 MG/DL (ref 0–199)
CHOLESTEROL/HDL RATIO: 2.9
CO2 SERPL-SCNC: 25 MMOL/L (ref 20–31)
CREAT SERPL-MCNC: 0.7 MG/DL (ref 0.7–1.2)
ERYTHROCYTE [DISTWIDTH] IN BLOOD BY AUTOMATED COUNT: 12.1 % (ref 11.8–14.4)
EST. AVERAGE GLUCOSE BLD GHB EST-MCNC: 111 MG/DL
GFR, ESTIMATED: >90 ML/MIN/1.73M2
GLUCOSE SERPL-MCNC: 127 MG/DL (ref 74–99)
HBA1C MFR BLD: 5.5 % (ref 4–6)
HCT VFR BLD AUTO: 42.5 % (ref 40.7–50.3)
HDLC SERPL-MCNC: 50 MG/DL
HGB BLD-MCNC: 14.9 G/DL (ref 13–17)
LDLC SERPL CALC-MCNC: 74 MG/DL (ref 0–100)
MCH RBC QN AUTO: 35.7 PG (ref 25.2–33.5)
MCHC RBC AUTO-ENTMCNC: 35.1 G/DL (ref 28.4–34.8)
MCV RBC AUTO: 101.9 FL (ref 82.6–102.9)
NRBC BLD-RTO: 0 PER 100 WBC
PLATELET # BLD AUTO: ABNORMAL K/UL (ref 138–453)
PLATELET, FLUORESCENCE: 103 K/UL (ref 138–453)
PLATELETS.RETICULATED NFR BLD AUTO: 8.2 % (ref 1.1–10.3)
POTASSIUM SERPL-SCNC: 3.8 MMOL/L (ref 3.7–5.3)
RBC # BLD AUTO: 4.17 M/UL (ref 4.21–5.77)
SODIUM SERPL-SCNC: 135 MMOL/L (ref 136–145)
TRIGL SERPL-MCNC: 105 MG/DL
VLDLC SERPL CALC-MCNC: 21 MG/DL (ref 1–30)
WBC OTHER # BLD: 6.7 K/UL (ref 3.5–11.3)

## 2024-12-18 PROCEDURE — 2022F DILAT RTA XM EVC RTNOPTHY: CPT | Performed by: INTERNAL MEDICINE

## 2024-12-18 PROCEDURE — G8484 FLU IMMUNIZE NO ADMIN: HCPCS | Performed by: INTERNAL MEDICINE

## 2024-12-18 PROCEDURE — 99214 OFFICE O/P EST MOD 30 MIN: CPT | Performed by: INTERNAL MEDICINE

## 2024-12-18 PROCEDURE — 3046F HEMOGLOBIN A1C LEVEL >9.0%: CPT | Performed by: INTERNAL MEDICINE

## 2024-12-18 PROCEDURE — 93005 ELECTROCARDIOGRAM TRACING: CPT | Performed by: INTERNAL MEDICINE

## 2024-12-18 PROCEDURE — 84450 TRANSFERASE (AST) (SGOT): CPT

## 2024-12-18 PROCEDURE — G8417 CALC BMI ABV UP PARAM F/U: HCPCS | Performed by: INTERNAL MEDICINE

## 2024-12-18 PROCEDURE — 3075F SYST BP GE 130 - 139MM HG: CPT | Performed by: INTERNAL MEDICINE

## 2024-12-18 PROCEDURE — 1159F MED LIST DOCD IN RCRD: CPT | Performed by: INTERNAL MEDICINE

## 2024-12-18 PROCEDURE — 84460 ALANINE AMINO (ALT) (SGPT): CPT

## 2024-12-18 PROCEDURE — 36415 COLL VENOUS BLD VENIPUNCTURE: CPT

## 2024-12-18 PROCEDURE — 1123F ACP DISCUSS/DSCN MKR DOCD: CPT | Performed by: INTERNAL MEDICINE

## 2024-12-18 PROCEDURE — 85027 COMPLETE CBC AUTOMATED: CPT

## 2024-12-18 PROCEDURE — G8427 DOCREV CUR MEDS BY ELIG CLIN: HCPCS | Performed by: INTERNAL MEDICINE

## 2024-12-18 PROCEDURE — 3078F DIAST BP <80 MM HG: CPT | Performed by: INTERNAL MEDICINE

## 2024-12-18 PROCEDURE — 93010 ELECTROCARDIOGRAM REPORT: CPT | Performed by: INTERNAL MEDICINE

## 2024-12-18 PROCEDURE — 83880 ASSAY OF NATRIURETIC PEPTIDE: CPT

## 2024-12-18 PROCEDURE — 80061 LIPID PANEL: CPT

## 2024-12-18 PROCEDURE — 80048 BASIC METABOLIC PNL TOTAL CA: CPT

## 2024-12-18 PROCEDURE — 1036F TOBACCO NON-USER: CPT | Performed by: INTERNAL MEDICINE

## 2024-12-18 PROCEDURE — 3017F COLORECTAL CA SCREEN DOC REV: CPT | Performed by: INTERNAL MEDICINE

## 2024-12-18 PROCEDURE — 83036 HEMOGLOBIN GLYCOSYLATED A1C: CPT

## 2024-12-18 RX ORDER — SEMAGLUTIDE 0.68 MG/ML
INJECTION, SOLUTION SUBCUTANEOUS
COMMUNITY
Start: 2024-12-16

## 2024-12-18 RX ORDER — AMLODIPINE BESYLATE 10 MG/1
TABLET ORAL
COMMUNITY
Start: 2024-12-06

## 2024-12-18 NOTE — PROGRESS NOTES
I, Janice Sommer am scribing for and in the presence of Markus Marie MD, F.A.C.C..    Patient: Jordan Brown  : 1960  Date of Visit: 2024    History of Present Illness:        REASON FOR VISIT / CONSULTATION:   Chief Complaint   Patient presents with    Coronary Artery Disease     HX: Mild CAD, HTN, LORRI, Idiopathic Cardiomyopathy. Pt is here for a yearly follow up. Pt is doing okay. SOB a little bit but not worsening. Denies CP, palps, light headed/dizziness.      Dear Paramjit Acuña MD,    I had the pleasure of seeing Jordan Brown in my office today for hospital follow up.   Mr. Brown is a 64 y.o. male with a history of a history of heart failure.     He initially presented to the emergency room on 10/21/2020 because of shortness of breath on exertion, orthopnea and paroxysmal nocturnal dyspnea.  Found to have tachycardia, abnormal ECG and acute on chronic combined CHF leading to his most recent cardiac work-up.    He has a history of hypertension and dyslipidemia.  He said he did not see Paramjit Acuña MD since .  Denies any history of diabetes.  He reported no significant family history of premature CAD.    Cardiac catheterization on 10/21/2020 showed mild nonobstructive CAD with 50% stenosis of mid LAD otherwise just minor luminal irregularities.  Nothing to explain his low ejection fraction.    Echo on 10/21/2020, ejection fraction is 35% with severe global hypokinesis and moderately increased left ventricular wall thickness.  Picture is suggestive of hypertensive heart disease with heart failure.    EKG done office 2020- Normal sinus rhythm 77 bpm    MUGA scan showed 44% on 20     Echocardiogram done on 2023: EF of 60% LV wall thickness is mild to moderately increased. The left atrium is mildly dilated (29-33) with a left atrial volume index of 29 ml/m2. Anterior free space seen consistent with a small pericardial effusion or fat pad. Evidence of mild

## 2024-12-23 ENCOUNTER — TELEPHONE (OUTPATIENT)
Dept: CARDIOLOGY | Age: 64
End: 2024-12-23

## 2024-12-23 NOTE — TELEPHONE ENCOUNTER
----- Message from Dr. Markus Marie MD sent at 12/21/2024  8:40 PM EST -----  Blood work is stable.  Continue current therapy and follow-up.  Please call with questions and/or concerns.  Thank you

## 2025-04-09 DIAGNOSIS — E03.9 HYPOTHYROIDISM, UNSPECIFIED TYPE: ICD-10-CM

## 2025-04-09 DIAGNOSIS — I42.9 IDIOPATHIC CARDIOMYOPATHY (HCC): ICD-10-CM

## 2025-04-09 RX ORDER — FUROSEMIDE 40 MG/1
40 TABLET ORAL DAILY
Qty: 90 TABLET | Refills: 3 | Status: SHIPPED | OUTPATIENT
Start: 2025-04-09

## 2025-04-09 NOTE — TELEPHONE ENCOUNTER
Patient request for refill to be sent to [facility] in [place]. Thank you

## 2025-05-28 RX ORDER — LOSARTAN POTASSIUM 100 MG/1
100 TABLET ORAL DAILY
Qty: 90 TABLET | Refills: 3 | Status: SHIPPED | OUTPATIENT
Start: 2025-05-28

## 2025-07-02 DIAGNOSIS — I42.9 IDIOPATHIC CARDIOMYOPATHY (HCC): ICD-10-CM

## 2025-07-02 DIAGNOSIS — E03.9 HYPOTHYROIDISM, UNSPECIFIED TYPE: ICD-10-CM

## 2025-07-02 RX ORDER — LEVOTHYROXINE SODIUM 150 UG/1
150 TABLET ORAL DAILY
Qty: 90 TABLET | Refills: 3 | Status: SHIPPED | OUTPATIENT
Start: 2025-07-02